# Patient Record
Sex: FEMALE | Race: WHITE | NOT HISPANIC OR LATINO | Employment: PART TIME | URBAN - METROPOLITAN AREA
[De-identification: names, ages, dates, MRNs, and addresses within clinical notes are randomized per-mention and may not be internally consistent; named-entity substitution may affect disease eponyms.]

---

## 2017-10-19 ENCOUNTER — OFFICE VISIT (OUTPATIENT)
Dept: FAMILY MEDICINE CLINIC | Facility: CLINIC | Age: 52
End: 2017-10-19

## 2017-10-19 VITALS
DIASTOLIC BLOOD PRESSURE: 72 MMHG | OXYGEN SATURATION: 99 % | WEIGHT: 143.2 LBS | HEART RATE: 71 BPM | SYSTOLIC BLOOD PRESSURE: 114 MMHG | HEIGHT: 63 IN | BODY MASS INDEX: 25.37 KG/M2 | TEMPERATURE: 98.5 F

## 2017-10-19 DIAGNOSIS — L70.9 ACNE, UNSPECIFIED ACNE TYPE: ICD-10-CM

## 2017-10-19 DIAGNOSIS — R10.30 LOWER ABDOMINAL PAIN: ICD-10-CM

## 2017-10-19 DIAGNOSIS — E03.9 HYPOTHYROIDISM, UNSPECIFIED TYPE: Primary | ICD-10-CM

## 2017-10-19 LAB
T4 FREE SERPL-MCNC: 1.11 NG/DL (ref 0.93–1.7)
TSH SERPL DL<=0.005 MIU/L-ACNC: 2.65 MIU/ML (ref 0.27–4.2)

## 2017-10-19 PROCEDURE — 99214 OFFICE O/P EST MOD 30 MIN: CPT | Performed by: NURSE PRACTITIONER

## 2017-10-19 NOTE — PROGRESS NOTES
"Subjective   Keke Gaitan is a 52 y.o. female.     History of Present Illness   Patient's presenting to the office today for refills on her Synthroid which she is using as directed daily without any side effects and her symptoms of hypothyroidism are well controlled.  She's on expansion any fatigue or cold intolerance.  Patient is also needing lab work to be updated regarding her thyroid.    She would also like a refill of her Bactroban ointment which she uses on occasion for acne.  It works well to control her breakouts and she typically uses it daily to twice a day.    She has been experiencing some abdominal pain to the right of her bellybutton that started back in April.  Her father was very ill and he tied in July she was having to do a lot of heavy lifting and moving him around and that's when the pain started.  She thought that it would get better after she no longer was doing heavy lifting but it has not.  She's not noticed any bulges she can't feel anything.  She's had multiple abdominal surgeries including fibroids removed hysterectomy and 2 C-sections in the past.  It comes and goes it hurts worse when she has any bending over to do lifting.    The following portions of the patient's history were reviewed and updated as appropriate: allergies, current medications, past social history and problem list.    Review of Systems   Constitutional: Negative for fever.   All other systems reviewed and are negative.      Objective   /72 (BP Location: Right arm, Patient Position: Sitting)  Pulse 71  Temp 98.5 °F (36.9 °C)  Ht 63\" (160 cm)  Wt 143 lb 3.2 oz (65 kg)  SpO2 99%  BMI 25.37 kg/m2  Physical Exam   Constitutional: She is oriented to person, place, and time. Vital signs are normal. She appears well-developed and well-nourished. No distress.   HENT:   Head: Normocephalic.   Cardiovascular: Normal rate, regular rhythm and normal heart sounds.    Pulmonary/Chest: Effort normal and breath " sounds normal.   Neurological: She is alert and oriented to person, place, and time. Gait normal.   Psychiatric: She has a normal mood and affect. Her behavior is normal. Judgment and thought content normal.   Vitals reviewed.      Assessment/Plan   Problem List Items Addressed This Visit        Endocrine    Hypothyroidism - Primary    Relevant Orders    TSH    T4, free       Nervous and Auditory    Lower abdominal pain       Musculoskeletal and Integument    Acne    Relevant Medications    mupirocin (BACTROBAN) 2 % ointment        Her abdominal exam is normal.  We discussed getting a CT scan to see what the cause of the pain as for now she would like to hold off and give it a little bit longer.  If it does get worse or she decides to investigate with a CT she will return to the office.      Follow up after labs

## 2017-10-20 DIAGNOSIS — E03.9 HYPOTHYROIDISM, UNSPECIFIED TYPE: ICD-10-CM

## 2017-10-20 RX ORDER — LEVOTHYROXINE SODIUM 0.05 MG/1
50 TABLET ORAL DAILY
Qty: 90 TABLET | Refills: 3 | Status: SHIPPED | OUTPATIENT
Start: 2017-10-20 | End: 2018-08-09 | Stop reason: SDUPTHER

## 2018-04-27 ENCOUNTER — OFFICE VISIT (OUTPATIENT)
Dept: FAMILY MEDICINE CLINIC | Facility: CLINIC | Age: 53
End: 2018-04-27

## 2018-04-27 VITALS
BODY MASS INDEX: 25.48 KG/M2 | HEIGHT: 63 IN | WEIGHT: 143.8 LBS | TEMPERATURE: 98.4 F | DIASTOLIC BLOOD PRESSURE: 70 MMHG | SYSTOLIC BLOOD PRESSURE: 120 MMHG | OXYGEN SATURATION: 99 % | HEART RATE: 88 BPM

## 2018-04-27 DIAGNOSIS — J32.9 SINUSITIS, UNSPECIFIED CHRONICITY, UNSPECIFIED LOCATION: Primary | ICD-10-CM

## 2018-04-27 DIAGNOSIS — B37.9 YEAST INFECTION: ICD-10-CM

## 2018-04-27 PROCEDURE — 99213 OFFICE O/P EST LOW 20 MIN: CPT | Performed by: NURSE PRACTITIONER

## 2018-04-27 RX ORDER — ESOMEPRAZOLE MAGNESIUM 40 MG/1
CAPSULE, DELAYED RELEASE ORAL
COMMUNITY
Start: 2017-10-17 | End: 2019-11-11

## 2018-04-27 RX ORDER — CIPROFLOXACIN 500 MG/1
500 TABLET, FILM COATED ORAL EVERY 12 HOURS SCHEDULED
Qty: 20 TABLET | Refills: 0 | Status: SHIPPED | OUTPATIENT
Start: 2018-04-27 | End: 2019-11-11

## 2018-04-27 RX ORDER — FLUCONAZOLE 150 MG/1
150 TABLET ORAL ONCE
Qty: 1 TABLET | Refills: 0 | Status: SHIPPED | OUTPATIENT
Start: 2018-04-27 | End: 2018-04-27

## 2018-04-27 NOTE — PROGRESS NOTES
"Subjective   Keke Gaitan is a 52 y.o. female.     History of Present Illness   Upper Respiratory Infection: Patient complains of symptoms of a URI, possible sinusitis. Symptoms include congestion, cough and swollen glands. Onset of symptoms was 15 days ago, gradually worsening since that time. She also c/o facial pain for the past 9 days .  She is drinking plenty of fluids. Evaluation to date: none. Treatment to date: antihistamines.        The following portions of the patient's history were reviewed and updated as appropriate: allergies, current medications, past social history and problem list.    Review of Systems   HENT: Positive for congestion, postnasal drip, sinus pain and sinus pressure.    Respiratory: Positive for cough.    Neurological: Positive for headaches.   All other systems reviewed and are negative.      Objective   /70 (BP Location: Left arm, Patient Position: Sitting)   Pulse 88   Temp 98.4 °F (36.9 °C)   Ht 160 cm (62.99\")   Wt 65.2 kg (143 lb 12.8 oz)   SpO2 99%   BMI 25.48 kg/m²   Physical Exam   Constitutional: She is oriented to person, place, and time. Vital signs are normal. She appears well-developed and well-nourished. No distress.   HENT:   Head: Normocephalic.   Cardiovascular: Normal rate, regular rhythm and normal heart sounds.    Pulmonary/Chest: Effort normal and breath sounds normal.   Neurological: She is alert and oriented to person, place, and time. Gait normal.   Psychiatric: She has a normal mood and affect. Her behavior is normal. Judgment and thought content normal.   Vitals reviewed.      Assessment/Plan   Problem List Items Addressed This Visit        Respiratory    Sinusitis - Primary    Relevant Medications    ciprofloxacin (CIPRO) 500 MG tablet      Other Visit Diagnoses     Yeast infection        Relevant Medications    fluconazole (DIFLUCAN) 150 MG tablet        rto prn       "

## 2018-08-09 DIAGNOSIS — E03.9 HYPOTHYROIDISM, UNSPECIFIED TYPE: ICD-10-CM

## 2018-08-09 RX ORDER — LEVOTHYROXINE SODIUM 0.05 MG/1
50 TABLET ORAL DAILY
Qty: 90 TABLET | Refills: 0 | Status: SHIPPED | OUTPATIENT
Start: 2018-08-09 | End: 2019-01-08 | Stop reason: SDUPTHER

## 2018-10-19 ENCOUNTER — TELEPHONE (OUTPATIENT)
Dept: FAMILY MEDICINE CLINIC | Facility: CLINIC | Age: 53
End: 2018-10-19

## 2018-10-19 DIAGNOSIS — E03.9 HYPOTHYROIDISM, UNSPECIFIED TYPE: Primary | ICD-10-CM

## 2018-10-22 NOTE — TELEPHONE ENCOUNTER
Pt states she doesn't necessarily need to go to Dr. Khoury, she just knows she is in network with her insurance. If you know somebody different you recommend more, she would rather go there.

## 2018-10-24 ENCOUNTER — TELEPHONE (OUTPATIENT)
Dept: FAMILY MEDICINE CLINIC | Facility: CLINIC | Age: 53
End: 2018-10-24

## 2018-10-24 NOTE — TELEPHONE ENCOUNTER
We referred patient to alexander and she is wondering if she should come in and get labs done before her appointment since she hasn't had labs done in over a year. Please call her and let her know.

## 2019-01-08 ENCOUNTER — TELEPHONE (OUTPATIENT)
Dept: FAMILY MEDICINE CLINIC | Facility: CLINIC | Age: 54
End: 2019-01-08

## 2019-01-08 DIAGNOSIS — E03.9 HYPOTHYROIDISM, UNSPECIFIED TYPE: ICD-10-CM

## 2019-01-08 RX ORDER — LEVOTHYROXINE SODIUM 0.05 MG/1
50 TABLET ORAL DAILY
Qty: 90 TABLET | Refills: 3 | Status: SHIPPED | OUTPATIENT
Start: 2019-01-08 | End: 2019-11-12 | Stop reason: SDUPTHER

## 2019-01-08 NOTE — TELEPHONE ENCOUNTER
Josette,    Pt left you a voicemail with a list of her medications that need to be filled, ninety day supply. Pt forgot to leave pharmacy on message. [facility]

## 2019-11-11 ENCOUNTER — OFFICE VISIT (OUTPATIENT)
Dept: FAMILY MEDICINE CLINIC | Facility: CLINIC | Age: 54
End: 2019-11-11

## 2019-11-11 VITALS
DIASTOLIC BLOOD PRESSURE: 76 MMHG | TEMPERATURE: 98 F | BODY MASS INDEX: 24.77 KG/M2 | OXYGEN SATURATION: 99 % | SYSTOLIC BLOOD PRESSURE: 124 MMHG | HEART RATE: 70 BPM | WEIGHT: 139.8 LBS | HEIGHT: 63 IN

## 2019-11-11 DIAGNOSIS — Z13.1 SCREENING FOR DIABETES MELLITUS: ICD-10-CM

## 2019-11-11 DIAGNOSIS — E78.5 HYPERLIPIDEMIA, UNSPECIFIED HYPERLIPIDEMIA TYPE: ICD-10-CM

## 2019-11-11 DIAGNOSIS — Z00.00 ROUTINE GENERAL MEDICAL EXAMINATION AT A HEALTH CARE FACILITY: Primary | ICD-10-CM

## 2019-11-11 DIAGNOSIS — E03.9 HYPOTHYROIDISM, UNSPECIFIED TYPE: ICD-10-CM

## 2019-11-11 DIAGNOSIS — Z13.0 SCREENING FOR IRON DEFICIENCY ANEMIA: ICD-10-CM

## 2019-11-11 DIAGNOSIS — E55.9 VITAMIN D DEFICIENCY: ICD-10-CM

## 2019-11-11 PROCEDURE — 99396 PREV VISIT EST AGE 40-64: CPT | Performed by: NURSE PRACTITIONER

## 2019-11-11 NOTE — PROGRESS NOTES
"Subjective   Keke Gaitan is a 54 y.o. female.     History of Present Illness   Well Adult Physical: Patient here for a comprehensive physical exam.The patient reports no problems  Do you take any herbs or supplements that were not prescribed by a doctor? no Are you taking calcium supplements? no Are you taking aspirin daily? no      The following portions of the patient's history were reviewed and updated as appropriate: allergies, current medications, past social history and problem list.    Review of Systems   Constitutional: Negative for fever.   Respiratory: Negative for cough and shortness of breath.    Cardiovascular: Negative for chest pain.   Gastrointestinal: Negative for abdominal pain.   Neurological: Negative for dizziness.       Objective   /76 (BP Location: Right arm, Patient Position: Sitting)   Pulse 70   Temp 98 °F (36.7 °C)   Ht 160 cm (63\")   Wt 63.4 kg (139 lb 12.8 oz)   SpO2 99%   BMI 24.76 kg/m²   Physical Exam   Constitutional: She is oriented to person, place, and time. She appears well-developed and well-nourished. No distress.   HENT:   Head: Normocephalic and atraumatic. Hair is normal.   Right Ear: Hearing, tympanic membrane, external ear and ear canal normal. No drainage. No decreased hearing is noted.   Left Ear: Hearing, tympanic membrane, external ear and ear canal normal. No decreased hearing is noted.   Nose: No nasal deformity.   Mouth/Throat: Oropharynx is clear and moist.   Eyes: Conjunctivae, EOM and lids are normal. Pupils are equal, round, and reactive to light. Right eye exhibits no discharge. Left eye exhibits no discharge.   Neck: Normal range of motion. Neck supple. No JVD present. No tracheal deviation present. No thyromegaly present.   Cardiovascular: Normal rate, regular rhythm, normal heart sounds, intact distal pulses and normal pulses. Exam reveals no gallop and no friction rub.   No murmur heard.  Pulmonary/Chest: Effort normal and breath sounds " normal. No respiratory distress. She has no wheezes. She has no rales. She exhibits no tenderness.   Abdominal: Soft. Bowel sounds are normal. She exhibits no distension and no mass. There is no tenderness. There is no rebound and no guarding. No hernia.   Musculoskeletal: Normal range of motion. She exhibits no edema, tenderness or deformity.   Lymphadenopathy:     She has no cervical adenopathy.   Neurological: She is alert and oriented to person, place, and time. She has normal reflexes. She displays normal reflexes. No cranial nerve deficit. She exhibits normal muscle tone. Coordination normal.   Skin: Skin is warm and dry. No rash noted. She is not diaphoretic. No erythema.   Psychiatric: She has a normal mood and affect. Her behavior is normal. Judgment and thought content normal.   Vitals reviewed.      Assessment/Plan      Diagnosis Plan   1. Routine general medical examination at a health care facility     2. Screening for iron deficiency anemia  CBC & Differential   3. Screening for diabetes mellitus  Comprehensive Metabolic Panel   4. Hyperlipidemia, unspecified hyperlipidemia type  Lipid Panel With LDL / HDL Ratio   5. Hypothyroidism, unspecified type  TSH   6. Vitamin D deficiency  Vitamin D 25 Hydroxy     Discussed weight, diet and exercise  Follow up after labs      DUKE Kat  11/11/2019

## 2019-11-12 ENCOUNTER — TELEPHONE (OUTPATIENT)
Dept: FAMILY MEDICINE CLINIC | Facility: CLINIC | Age: 54
End: 2019-11-12

## 2019-11-12 DIAGNOSIS — E03.9 HYPOTHYROIDISM, UNSPECIFIED TYPE: ICD-10-CM

## 2019-11-12 LAB
25(OH)D3+25(OH)D2 SERPL-MCNC: 46.2 NG/ML (ref 30–100)
ALBUMIN SERPL-MCNC: 4.7 G/DL (ref 3.5–5.2)
ALBUMIN/GLOB SERPL: 1.9 G/DL
ALP SERPL-CCNC: 89 U/L (ref 39–117)
ALT SERPL-CCNC: 20 U/L (ref 1–33)
AST SERPL-CCNC: 22 U/L (ref 1–32)
BASOPHILS # BLD AUTO: 0.03 10*3/MM3 (ref 0–0.2)
BASOPHILS NFR BLD AUTO: 0.8 % (ref 0–1.5)
BILIRUB SERPL-MCNC: 0.4 MG/DL (ref 0.2–1.2)
BUN SERPL-MCNC: 17 MG/DL (ref 6–20)
BUN/CREAT SERPL: 21.8 (ref 7–25)
CALCIUM SERPL-MCNC: 9.6 MG/DL (ref 8.6–10.5)
CHLORIDE SERPL-SCNC: 103 MMOL/L (ref 98–107)
CHOLEST SERPL-MCNC: 211 MG/DL (ref 0–200)
CO2 SERPL-SCNC: 29 MMOL/L (ref 22–29)
CREAT SERPL-MCNC: 0.78 MG/DL (ref 0.57–1)
EOSINOPHIL # BLD AUTO: 0.13 10*3/MM3 (ref 0–0.4)
EOSINOPHIL NFR BLD AUTO: 3.6 % (ref 0.3–6.2)
ERYTHROCYTE [DISTWIDTH] IN BLOOD BY AUTOMATED COUNT: 12.5 % (ref 12.3–15.4)
GLOBULIN SER CALC-MCNC: 2.5 GM/DL
GLUCOSE SERPL-MCNC: 91 MG/DL (ref 65–99)
HCT VFR BLD AUTO: 39 % (ref 34–46.6)
HDLC SERPL-MCNC: 66 MG/DL (ref 40–60)
HGB BLD-MCNC: 13.3 G/DL (ref 12–15.9)
IMM GRANULOCYTES # BLD AUTO: 0.01 10*3/MM3 (ref 0–0.05)
IMM GRANULOCYTES NFR BLD AUTO: 0.3 % (ref 0–0.5)
LDLC SERPL CALC-MCNC: 120 MG/DL (ref 0–100)
LDLC/HDLC SERPL: 1.82 {RATIO}
LYMPHOCYTES # BLD AUTO: 1.31 10*3/MM3 (ref 0.7–3.1)
LYMPHOCYTES NFR BLD AUTO: 35.9 % (ref 19.6–45.3)
MCH RBC QN AUTO: 29.6 PG (ref 26.6–33)
MCHC RBC AUTO-ENTMCNC: 34.1 G/DL (ref 31.5–35.7)
MCV RBC AUTO: 86.7 FL (ref 79–97)
MONOCYTES # BLD AUTO: 0.36 10*3/MM3 (ref 0.1–0.9)
MONOCYTES NFR BLD AUTO: 9.9 % (ref 5–12)
NEUTROPHILS # BLD AUTO: 1.81 10*3/MM3 (ref 1.7–7)
NEUTROPHILS NFR BLD AUTO: 49.5 % (ref 42.7–76)
NRBC BLD AUTO-RTO: 0 /100 WBC (ref 0–0.2)
PLATELET # BLD AUTO: 172 10*3/MM3 (ref 140–450)
POTASSIUM SERPL-SCNC: 4 MMOL/L (ref 3.5–5.2)
PROT SERPL-MCNC: 7.2 G/DL (ref 6–8.5)
RBC # BLD AUTO: 4.5 10*6/MM3 (ref 3.77–5.28)
SODIUM SERPL-SCNC: 143 MMOL/L (ref 136–145)
TRIGL SERPL-MCNC: 126 MG/DL (ref 0–150)
TSH SERPL DL<=0.005 MIU/L-ACNC: 3.02 UIU/ML (ref 0.27–4.2)
VLDLC SERPL CALC-MCNC: 25.2 MG/DL
WBC # BLD AUTO: 3.65 10*3/MM3 (ref 3.4–10.8)

## 2019-11-12 RX ORDER — LEVOTHYROXINE SODIUM 0.05 MG/1
50 TABLET ORAL DAILY
Qty: 90 TABLET | Refills: 3 | Status: SHIPPED | OUTPATIENT
Start: 2019-11-12 | End: 2020-11-13 | Stop reason: SDUPTHER

## 2019-11-12 NOTE — TELEPHONE ENCOUNTER
Yes I will.    Patient called and requested a ninety day refill of levothyroxine 50mcg to [facility]

## 2020-10-13 ENCOUNTER — TELEPHONE (OUTPATIENT)
Dept: FAMILY MEDICINE CLINIC | Facility: CLINIC | Age: 55
End: 2020-10-13

## 2020-10-13 NOTE — TELEPHONE ENCOUNTER
DIEGO CALLED ON BEHALF OF PATIENT WONDERING IF THE [facility] IS IN NETWORK FOR PATIENT.     SHE REQUESTED A CALL BACK TO PATIENT DIRECTLY:   [phone]

## 2020-10-14 NOTE — TELEPHONE ENCOUNTER
Pt is aware that this is [facility]'s responsibility to know if [facility] is in network. She states they need the NPI. I advised her to have insurance call [facility] for this info

## 2020-10-14 NOTE — TELEPHONE ENCOUNTER
Yes, the insurance company should know which facility is in network for patient. Please notify patient we received confusing message and let her know to follow up with insurance directly for additional info.

## 2020-11-12 ENCOUNTER — OFFICE VISIT (OUTPATIENT)
Dept: FAMILY MEDICINE CLINIC | Facility: CLINIC | Age: 55
End: 2020-11-12

## 2020-11-12 ENCOUNTER — RESULTS ENCOUNTER (OUTPATIENT)
Dept: FAMILY MEDICINE CLINIC | Facility: CLINIC | Age: 55
End: 2020-11-12

## 2020-11-12 VITALS
TEMPERATURE: 97.8 F | BODY MASS INDEX: 25.48 KG/M2 | SYSTOLIC BLOOD PRESSURE: 122 MMHG | DIASTOLIC BLOOD PRESSURE: 70 MMHG | HEART RATE: 70 BPM | WEIGHT: 143.8 LBS | OXYGEN SATURATION: 98 % | HEIGHT: 63 IN

## 2020-11-12 DIAGNOSIS — Z12.11 COLON CANCER SCREENING: ICD-10-CM

## 2020-11-12 DIAGNOSIS — Z13.0 SCREENING FOR IRON DEFICIENCY ANEMIA: ICD-10-CM

## 2020-11-12 DIAGNOSIS — Z13.1 SCREENING FOR DIABETES MELLITUS: ICD-10-CM

## 2020-11-12 DIAGNOSIS — E03.9 HYPOTHYROIDISM, UNSPECIFIED TYPE: ICD-10-CM

## 2020-11-12 DIAGNOSIS — Z13.6 SCREENING FOR ISCHEMIC HEART DISEASE: ICD-10-CM

## 2020-11-12 DIAGNOSIS — Z00.00 ROUTINE GENERAL MEDICAL EXAMINATION AT A HEALTH CARE FACILITY: Primary | ICD-10-CM

## 2020-11-12 PROBLEM — J32.9 SINUSITIS: Status: RESOLVED | Noted: 2018-04-27 | Resolved: 2020-11-12

## 2020-11-12 PROCEDURE — 99396 PREV VISIT EST AGE 40-64: CPT | Performed by: NURSE PRACTITIONER

## 2020-11-12 RX ORDER — CALCIUM CARBONATE 200(500)MG
1 TABLET,CHEWABLE ORAL DAILY
COMMUNITY

## 2020-11-12 NOTE — PROGRESS NOTES
"Subjective   Keke Gaitan is a 55 y.o. female.   Chief Complaint   Patient presents with   • Annual Exam     Patient is fasting to have her labs drawn and her thyroid levels checked  ( wore mask and goggles)        History of Present Illness   Well Adult Physical: Patient here for a comprehensive physical exam.The patient reports no problems  Do you take any herbs or supplements that were not prescribed by a doctor? no Are you taking calcium supplements? no Are you taking aspirin daily? no      The following portions of the patient's history were reviewed and updated as appropriate: allergies, current medications, past social history and problem list.    Review of Systems   Constitutional: Negative for fever.   Respiratory: Negative for cough and shortness of breath.    Cardiovascular: Negative for chest pain.   Gastrointestinal: Negative for abdominal pain.   Neurological: Negative for dizziness.       Objective   /70   Pulse 70   Temp 97.8 °F (36.6 °C)   Ht 160 cm (63\")   Wt 65.2 kg (143 lb 12.8 oz)   SpO2 98%   BMI 25.47 kg/m²   Physical Exam  Vitals signs reviewed.   Constitutional:       General: She is not in acute distress.     Appearance: She is well-developed. She is not diaphoretic.   HENT:      Head: Normocephalic and atraumatic. Hair is normal.      Right Ear: Hearing, tympanic membrane, ear canal and external ear normal. No decreased hearing noted. No drainage.      Left Ear: Hearing, tympanic membrane, ear canal and external ear normal. No decreased hearing noted.      Nose: No nasal deformity.   Eyes:      General: Lids are normal.         Right eye: No discharge.         Left eye: No discharge.      Conjunctiva/sclera: Conjunctivae normal.      Pupils: Pupils are equal, round, and reactive to light.   Neck:      Musculoskeletal: Normal range of motion and neck supple.      Thyroid: No thyromegaly.      Vascular: No JVD.      Trachea: No tracheal deviation.   Cardiovascular:      " Rate and Rhythm: Normal rate and regular rhythm.      Pulses: Normal pulses.      Heart sounds: Normal heart sounds. No murmur. No friction rub. No gallop.    Pulmonary:      Effort: Pulmonary effort is normal. No respiratory distress.      Breath sounds: Normal breath sounds. No wheezing or rales.   Chest:      Chest wall: No tenderness.   Abdominal:      General: Bowel sounds are normal. There is no distension.      Palpations: Abdomen is soft. There is no mass.      Tenderness: There is no abdominal tenderness. There is no guarding or rebound.      Hernia: No hernia is present.   Musculoskeletal: Normal range of motion.         General: No tenderness or deformity.   Lymphadenopathy:      Cervical: No cervical adenopathy.   Skin:     General: Skin is warm and dry.      Findings: No erythema or rash.   Neurological:      Mental Status: She is alert and oriented to person, place, and time.      Cranial Nerves: No cranial nerve deficit.      Motor: No abnormal muscle tone.      Coordination: Coordination normal.      Deep Tendon Reflexes: Reflexes are normal and symmetric. Reflexes normal.   Psychiatric:         Behavior: Behavior normal.         Thought Content: Thought content normal.         Judgment: Judgment normal.         Assessment/Plan      Diagnosis Plan   1. Routine general medical examination at a health care facility     2. Screening for iron deficiency anemia  CBC & Differential   3. Screening for diabetes mellitus  Comprehensive Metabolic Panel   4. Screening for ischemic heart disease  Lipid Panel With LDL / HDL Ratio   5. Hypothyroidism, unspecified type  TSH   6. Colon cancer screening  Cologuard - Stool, Per Rectum     Discussed weight, diet and exercise  Follow up after labs      Mask and googles worn    DUKE Kat  11/12/2020

## 2020-11-13 DIAGNOSIS — E03.9 HYPOTHYROIDISM, UNSPECIFIED TYPE: ICD-10-CM

## 2020-11-13 LAB
ALBUMIN SERPL-MCNC: 4.6 G/DL (ref 3.5–5.2)
ALBUMIN/GLOB SERPL: 1.9 G/DL
ALP SERPL-CCNC: 91 U/L (ref 39–117)
ALT SERPL-CCNC: 26 U/L (ref 1–33)
AST SERPL-CCNC: 24 U/L (ref 1–32)
BASOPHILS # BLD AUTO: 0.03 10*3/MM3 (ref 0–0.2)
BASOPHILS NFR BLD AUTO: 0.6 % (ref 0–1.5)
BILIRUB SERPL-MCNC: 0.3 MG/DL (ref 0–1.2)
BUN SERPL-MCNC: 16 MG/DL (ref 6–20)
BUN/CREAT SERPL: 19.3 (ref 7–25)
CALCIUM SERPL-MCNC: 9.6 MG/DL (ref 8.6–10.5)
CHLORIDE SERPL-SCNC: 101 MMOL/L (ref 98–107)
CHOLEST SERPL-MCNC: 215 MG/DL (ref 0–200)
CO2 SERPL-SCNC: 30.6 MMOL/L (ref 22–29)
CREAT SERPL-MCNC: 0.83 MG/DL (ref 0.57–1)
EOSINOPHIL # BLD AUTO: 0.13 10*3/MM3 (ref 0–0.4)
EOSINOPHIL NFR BLD AUTO: 2.4 % (ref 0.3–6.2)
ERYTHROCYTE [DISTWIDTH] IN BLOOD BY AUTOMATED COUNT: 12.8 % (ref 12.3–15.4)
GLOBULIN SER CALC-MCNC: 2.4 GM/DL
GLUCOSE SERPL-MCNC: 89 MG/DL (ref 65–99)
HCT VFR BLD AUTO: 42.1 % (ref 34–46.6)
HDLC SERPL-MCNC: 76 MG/DL (ref 40–60)
HGB BLD-MCNC: 13.6 G/DL (ref 12–15.9)
IMM GRANULOCYTES # BLD AUTO: 0.02 10*3/MM3 (ref 0–0.05)
IMM GRANULOCYTES NFR BLD AUTO: 0.4 % (ref 0–0.5)
LDLC SERPL CALC-MCNC: 120 MG/DL (ref 0–100)
LDLC/HDLC SERPL: 1.54 {RATIO}
LYMPHOCYTES # BLD AUTO: 1.57 10*3/MM3 (ref 0.7–3.1)
LYMPHOCYTES NFR BLD AUTO: 28.9 % (ref 19.6–45.3)
MCH RBC QN AUTO: 28 PG (ref 26.6–33)
MCHC RBC AUTO-ENTMCNC: 32.3 G/DL (ref 31.5–35.7)
MCV RBC AUTO: 86.6 FL (ref 79–97)
MONOCYTES # BLD AUTO: 0.39 10*3/MM3 (ref 0.1–0.9)
MONOCYTES NFR BLD AUTO: 7.2 % (ref 5–12)
NEUTROPHILS # BLD AUTO: 3.29 10*3/MM3 (ref 1.7–7)
NEUTROPHILS NFR BLD AUTO: 60.5 % (ref 42.7–76)
NRBC BLD AUTO-RTO: 0 /100 WBC (ref 0–0.2)
PLATELET # BLD AUTO: 193 10*3/MM3 (ref 140–450)
POTASSIUM SERPL-SCNC: 4.2 MMOL/L (ref 3.5–5.2)
PROT SERPL-MCNC: 7 G/DL (ref 6–8.5)
RBC # BLD AUTO: 4.86 10*6/MM3 (ref 3.77–5.28)
SODIUM SERPL-SCNC: 139 MMOL/L (ref 136–145)
TRIGL SERPL-MCNC: 111 MG/DL (ref 0–150)
TSH SERPL DL<=0.005 MIU/L-ACNC: 2.74 UIU/ML (ref 0.27–4.2)
VLDLC SERPL CALC-MCNC: 19 MG/DL (ref 5–40)
WBC # BLD AUTO: 5.43 10*3/MM3 (ref 3.4–10.8)

## 2020-11-13 RX ORDER — LEVOTHYROXINE SODIUM 0.05 MG/1
50 TABLET ORAL DAILY
Qty: 90 TABLET | Refills: 3 | Status: SHIPPED | OUTPATIENT
Start: 2020-11-13 | End: 2021-09-27

## 2021-01-06 ENCOUNTER — TELEPHONE (OUTPATIENT)
Dept: FAMILY MEDICINE CLINIC | Facility: CLINIC | Age: 56
End: 2021-01-06

## 2021-01-06 NOTE — TELEPHONE ENCOUNTER
Patient called and she had blood drawn 11/12 and since then she has been experiencing pain in her rt arm above and below where the blood was drawn. Sometimes get a tingling down to her fingers. The vein now looks different as well. Would like a call back to advise at [phone]. Not sure if anything can be done just been frustrated with it and hoping it will get better and the more she does with her arm the worse it gets.

## 2021-01-06 NOTE — TELEPHONE ENCOUNTER
Not protruding or hard just looks more blue than normal. She describes it as looking like a blue vein in your leg but its below where blood was drawn and it feels bruised. She gets sharp pains in that area, it hurts when she uses her arm, touches it and even when she is just laying there it is aching but started after she had blood draw. She has had tingling in her right pinky and hand. She just wants christy to be aware and if there is something that needs to be done

## 2021-01-13 ENCOUNTER — TELEPHONE (OUTPATIENT)
Dept: FAMILY MEDICINE CLINIC | Facility: CLINIC | Age: 56
End: 2021-01-13

## 2021-01-13 NOTE — TELEPHONE ENCOUNTER
PATIENT CALLED TO CANCEL APPOINTMENT FOR Friday AND HAS ICED HER RIGHT ARM AND NOT MUCH CHANGE. SHE WILL CONTINUE TO WAIT AND SEE IF ANY IMPROVEMENT WITH TIME.     CALL BACK NUMBER [phone]

## 2021-09-26 DIAGNOSIS — E03.9 HYPOTHYROIDISM, UNSPECIFIED TYPE: ICD-10-CM

## 2021-09-27 RX ORDER — LEVOTHYROXINE SODIUM 0.05 MG/1
TABLET ORAL
Qty: 90 TABLET | Refills: 3 | Status: SHIPPED | OUTPATIENT
Start: 2021-09-27 | End: 2021-12-28 | Stop reason: SDUPTHER

## 2021-11-15 ENCOUNTER — OFFICE VISIT (OUTPATIENT)
Dept: FAMILY MEDICINE CLINIC | Facility: CLINIC | Age: 56
End: 2021-11-15

## 2021-11-15 VITALS
HEART RATE: 70 BPM | HEIGHT: 63 IN | TEMPERATURE: 97.1 F | OXYGEN SATURATION: 99 % | SYSTOLIC BLOOD PRESSURE: 110 MMHG | BODY MASS INDEX: 25.73 KG/M2 | DIASTOLIC BLOOD PRESSURE: 74 MMHG | WEIGHT: 145.2 LBS

## 2021-11-15 DIAGNOSIS — Z00.00 ROUTINE GENERAL MEDICAL EXAMINATION AT A HEALTH CARE FACILITY: Primary | ICD-10-CM

## 2021-11-15 DIAGNOSIS — Z13.0 SCREENING FOR IRON DEFICIENCY ANEMIA: ICD-10-CM

## 2021-11-15 DIAGNOSIS — Z13.1 SCREENING FOR DIABETES MELLITUS: ICD-10-CM

## 2021-11-15 DIAGNOSIS — E78.5 HYPERLIPIDEMIA, UNSPECIFIED HYPERLIPIDEMIA TYPE: ICD-10-CM

## 2021-11-15 DIAGNOSIS — E55.9 VITAMIN D DEFICIENCY: ICD-10-CM

## 2021-11-15 DIAGNOSIS — E03.9 HYPOTHYROIDISM, UNSPECIFIED TYPE: ICD-10-CM

## 2021-11-15 PROCEDURE — 99396 PREV VISIT EST AGE 40-64: CPT | Performed by: NURSE PRACTITIONER

## 2021-11-15 NOTE — PROGRESS NOTES
"Chief Complaint  Annual Exam (Patient is fasting she needs thyroid levels checked as well.  She wants if she needs to do thyroid u/s  ( wore mask and goggles) )    Subjective          Keke Gaitan presents to [facility]  History of Present Illness  Well Adult Physical: Patient here for a comprehensive physical exam.The patient reports no problems  Do you take any herbs or supplements that were not prescribed by a doctor? no Are you taking calcium supplements? no Are you taking aspirin daily? no        Objective   Vital Signs:   /74   Pulse 70   Temp 97.1 °F (36.2 °C)   Ht 160 cm (63\")   Wt 65.9 kg (145 lb 3.2 oz)   SpO2 99%   BMI 25.72 kg/m²     Physical Exam  Vitals reviewed.   Constitutional:       General: She is not in acute distress.     Appearance: She is well-developed. She is not diaphoretic.   HENT:      Head: Normocephalic and atraumatic. Hair is normal.      Right Ear: Hearing, tympanic membrane, ear canal and external ear normal. No decreased hearing noted. No drainage.      Left Ear: Hearing, tympanic membrane, ear canal and external ear normal. No decreased hearing noted.      Nose: No nasal deformity.   Eyes:      General: Lids are normal.         Right eye: No discharge.         Left eye: No discharge.      Conjunctiva/sclera: Conjunctivae normal.      Pupils: Pupils are equal, round, and reactive to light.   Neck:      Thyroid: No thyromegaly.      Vascular: No JVD.      Trachea: No tracheal deviation.   Cardiovascular:      Rate and Rhythm: Normal rate and regular rhythm.      Pulses: Normal pulses.      Heart sounds: Normal heart sounds. No murmur heard.  No friction rub. No gallop.    Pulmonary:      Effort: Pulmonary effort is normal. No respiratory distress.      Breath sounds: Normal breath sounds. No wheezing or rales.   Chest:      Chest wall: No tenderness.   Abdominal:      General: Bowel sounds are normal. There is no distension.      " Palpations: Abdomen is soft. There is no mass.      Tenderness: There is no abdominal tenderness. There is no guarding or rebound.      Hernia: No hernia is present.   Musculoskeletal:         General: No tenderness or deformity. Normal range of motion.      Cervical back: Normal range of motion and neck supple.   Lymphadenopathy:      Cervical: No cervical adenopathy.   Skin:     General: Skin is warm and dry.      Findings: No erythema or rash.   Neurological:      Mental Status: She is alert and oriented to person, place, and time.      Cranial Nerves: No cranial nerve deficit.      Motor: No abnormal muscle tone.      Coordination: Coordination normal.      Deep Tendon Reflexes: Reflexes are normal and symmetric. Reflexes normal.   Psychiatric:         Behavior: Behavior normal.         Thought Content: Thought content normal.         Judgment: Judgment normal.        Result Review :   The following data was reviewed by: DUKE Kat on 11/15/2021:                              Assessment and Plan    Diagnoses and all orders for this visit:    1. Routine general medical examination at a health care facility (Primary)    2. Screening for iron deficiency anemia  -     CBC & Differential    3. Screening for diabetes mellitus  -     Comprehensive Metabolic Panel    4. Hyperlipidemia, unspecified hyperlipidemia type  -     Comprehensive Metabolic Panel  -     Lipid Panel With LDL / HDL Ratio    5. Hypothyroidism, unspecified type  -     TSH  -     US Thyroid    6. Vitamin D deficiency  -     Vitamin D 25 Hydroxy        Follow Up   Return if symptoms worsen or fail to improve.  Patient was given instructions and counseling regarding her condition or for health maintenance advice. Please see specific information pulled into the AVS if appropriate.     Discussed weight, diet and exercise  Follow up after labs    Mask and googles worn  cologuard was last year- normal  mammo is this Friday

## 2021-11-16 LAB
25(OH)D3+25(OH)D2 SERPL-MCNC: 44.5 NG/ML (ref 30–100)
ALBUMIN SERPL-MCNC: 4.5 G/DL (ref 3.8–4.9)
ALBUMIN/GLOB SERPL: 1.6 {RATIO} (ref 1.2–2.2)
ALP SERPL-CCNC: 98 IU/L (ref 44–121)
ALT SERPL-CCNC: 34 IU/L (ref 0–32)
AST SERPL-CCNC: 33 IU/L (ref 0–40)
BASOPHILS # BLD AUTO: 0 X10E3/UL (ref 0–0.2)
BASOPHILS NFR BLD AUTO: 1 %
BILIRUB SERPL-MCNC: 0.3 MG/DL (ref 0–1.2)
BUN SERPL-MCNC: 16 MG/DL (ref 6–24)
BUN/CREAT SERPL: 21 (ref 9–23)
CALCIUM SERPL-MCNC: 9.6 MG/DL (ref 8.7–10.2)
CHLORIDE SERPL-SCNC: 102 MMOL/L (ref 96–106)
CHOLEST SERPL-MCNC: 223 MG/DL (ref 100–199)
CO2 SERPL-SCNC: 25 MMOL/L (ref 20–29)
CREAT SERPL-MCNC: 0.78 MG/DL (ref 0.57–1)
EOSINOPHIL # BLD AUTO: 0.1 X10E3/UL (ref 0–0.4)
EOSINOPHIL NFR BLD AUTO: 2 %
ERYTHROCYTE [DISTWIDTH] IN BLOOD BY AUTOMATED COUNT: 12.4 % (ref 11.7–15.4)
GLOBULIN SER CALC-MCNC: 2.8 G/DL (ref 1.5–4.5)
GLUCOSE SERPL-MCNC: 88 MG/DL (ref 65–99)
HCT VFR BLD AUTO: 42.2 % (ref 34–46.6)
HDLC SERPL-MCNC: 70 MG/DL
HGB BLD-MCNC: 13.6 G/DL (ref 11.1–15.9)
IMM GRANULOCYTES # BLD AUTO: 0 X10E3/UL (ref 0–0.1)
IMM GRANULOCYTES NFR BLD AUTO: 0 %
LDLC SERPL CALC-MCNC: 131 MG/DL (ref 0–99)
LDLC/HDLC SERPL: 1.9 RATIO (ref 0–3.2)
LYMPHOCYTES # BLD AUTO: 1.4 X10E3/UL (ref 0.7–3.1)
LYMPHOCYTES NFR BLD AUTO: 33 %
MCH RBC QN AUTO: 28 PG (ref 26.6–33)
MCHC RBC AUTO-ENTMCNC: 32.2 G/DL (ref 31.5–35.7)
MCV RBC AUTO: 87 FL (ref 79–97)
MONOCYTES # BLD AUTO: 0.3 X10E3/UL (ref 0.1–0.9)
MONOCYTES NFR BLD AUTO: 7 %
NEUTROPHILS # BLD AUTO: 2.5 X10E3/UL (ref 1.4–7)
NEUTROPHILS NFR BLD AUTO: 57 %
PLATELET # BLD AUTO: 190 X10E3/UL (ref 150–450)
POTASSIUM SERPL-SCNC: 4.3 MMOL/L (ref 3.5–5.2)
PROT SERPL-MCNC: 7.3 G/DL (ref 6–8.5)
RBC # BLD AUTO: 4.86 X10E6/UL (ref 3.77–5.28)
SODIUM SERPL-SCNC: 140 MMOL/L (ref 134–144)
TRIGL SERPL-MCNC: 125 MG/DL (ref 0–149)
TSH SERPL DL<=0.005 MIU/L-ACNC: 1.7 UIU/ML (ref 0.45–4.5)
VLDLC SERPL CALC-MCNC: 22 MG/DL (ref 5–40)
WBC # BLD AUTO: 4.4 X10E3/UL (ref 3.4–10.8)

## 2021-11-17 RX ORDER — ROSUVASTATIN CALCIUM 5 MG/1
5 TABLET, COATED ORAL DAILY
Qty: 90 TABLET | Refills: 1 | Status: SHIPPED | OUTPATIENT
Start: 2021-11-17 | End: 2021-12-28 | Stop reason: SDUPTHER

## 2021-12-15 ENCOUNTER — APPOINTMENT (OUTPATIENT)
Dept: ULTRASOUND IMAGING | Facility: HOSPITAL | Age: 56
End: 2021-12-15

## 2021-12-27 ENCOUNTER — HOSPITAL ENCOUNTER (OUTPATIENT)
Dept: ULTRASOUND IMAGING | Facility: HOSPITAL | Age: 56
Discharge: HOME | End: 2021-12-27
Admitting: NURSE PRACTITIONER

## 2021-12-27 PROCEDURE — 76536 US EXAM OF HEAD AND NECK: CPT

## 2021-12-28 DIAGNOSIS — E03.9 HYPOTHYROIDISM, UNSPECIFIED TYPE: ICD-10-CM

## 2021-12-28 RX ORDER — ROSUVASTATIN CALCIUM 5 MG/1
5 TABLET, COATED ORAL DAILY
Qty: 90 TABLET | Refills: 3 | Status: SHIPPED | OUTPATIENT
Start: 2021-12-28 | End: 2022-10-04 | Stop reason: SDUPTHER

## 2021-12-28 RX ORDER — LEVOTHYROXINE SODIUM 0.05 MG/1
50 TABLET ORAL DAILY
Qty: 90 TABLET | Refills: 3 | Status: SHIPPED | OUTPATIENT
Start: 2021-12-28 | End: 2022-05-19 | Stop reason: DRUGHIGH

## 2021-12-28 NOTE — TELEPHONE ENCOUNTER
Rx Refill Note  Requested Prescriptions     Signed Prescriptions Disp Refills   • levothyroxine (SYNTHROID, LEVOTHROID) 50 MCG tablet 90 tablet 3     Sig: Take 1 tablet by mouth Daily.     Authorizing Provider: CELI VICTOR     Ordering User: EDNA RAMIREZ   • rosuvastatin (Crestor) 5 MG tablet 90 tablet 3     Sig: Take 1 tablet by mouth Daily.     Authorizing Provider: CELI VICTOR     Ordering User: EDNA RAMIREZ      Last office visit with prescribing clinician: 11/15/2021      Next office visit with prescribing clinician: 11/18/2022            Edna Ramirez MA  12/28/21, 16:07 EST

## 2022-05-11 DIAGNOSIS — Z13.0 SCREENING FOR IRON DEFICIENCY ANEMIA: ICD-10-CM

## 2022-05-11 DIAGNOSIS — E78.5 HYPERLIPIDEMIA, UNSPECIFIED HYPERLIPIDEMIA TYPE: ICD-10-CM

## 2022-05-11 DIAGNOSIS — Z13.1 SCREENING FOR DIABETES MELLITUS: ICD-10-CM

## 2022-05-11 DIAGNOSIS — Z13.0 SCREENING FOR IRON DEFICIENCY ANEMIA: Primary | ICD-10-CM

## 2022-05-11 DIAGNOSIS — E55.9 VITAMIN D DEFICIENCY: ICD-10-CM

## 2022-05-11 DIAGNOSIS — E03.9 HYPOTHYROIDISM, UNSPECIFIED TYPE: ICD-10-CM

## 2022-05-19 ENCOUNTER — TELEPHONE (OUTPATIENT)
Dept: FAMILY MEDICINE CLINIC | Facility: CLINIC | Age: 57
End: 2022-05-19

## 2022-05-19 DIAGNOSIS — E78.5 HYPERLIPIDEMIA, UNSPECIFIED HYPERLIPIDEMIA TYPE: Primary | ICD-10-CM

## 2022-05-19 LAB
25(OH)D3+25(OH)D2 SERPL-MCNC: 45.5 NG/ML (ref 30–100)
ALBUMIN SERPL-MCNC: 4.8 G/DL (ref 3.8–4.9)
ALBUMIN/GLOB SERPL: 1.9 {RATIO} (ref 1.2–2.2)
ALP SERPL-CCNC: 88 IU/L (ref 44–121)
ALT SERPL-CCNC: 32 IU/L (ref 0–32)
AST SERPL-CCNC: 33 IU/L (ref 0–40)
BASOPHILS # BLD AUTO: 0 X10E3/UL (ref 0–0.2)
BASOPHILS NFR BLD AUTO: 1 %
BILIRUB SERPL-MCNC: 0.3 MG/DL (ref 0–1.2)
BUN SERPL-MCNC: 16 MG/DL (ref 6–24)
BUN/CREAT SERPL: 19 (ref 9–23)
CALCIUM SERPL-MCNC: 10.1 MG/DL (ref 8.7–10.2)
CHLORIDE SERPL-SCNC: 102 MMOL/L (ref 96–106)
CHOLEST SERPL-MCNC: 180 MG/DL (ref 100–199)
CO2 SERPL-SCNC: 25 MMOL/L (ref 20–29)
CREAT SERPL-MCNC: 0.83 MG/DL (ref 0.57–1)
EGFRCR SERPLBLD CKD-EPI 2021: 83 ML/MIN/1.73
EOSINOPHIL # BLD AUTO: 0.1 X10E3/UL (ref 0–0.4)
EOSINOPHIL NFR BLD AUTO: 3 %
ERYTHROCYTE [DISTWIDTH] IN BLOOD BY AUTOMATED COUNT: 12.5 % (ref 11.7–15.4)
GLOBULIN SER CALC-MCNC: 2.5 G/DL (ref 1.5–4.5)
GLUCOSE SERPL-MCNC: 83 MG/DL (ref 65–99)
HCT VFR BLD AUTO: 41.2 % (ref 34–46.6)
HDLC SERPL-MCNC: 79 MG/DL
HGB BLD-MCNC: 13.6 G/DL (ref 11.1–15.9)
IMM GRANULOCYTES # BLD AUTO: 0 X10E3/UL (ref 0–0.1)
IMM GRANULOCYTES NFR BLD AUTO: 0 %
LDLC SERPL CALC-MCNC: 87 MG/DL (ref 0–99)
LYMPHOCYTES # BLD AUTO: 1.7 X10E3/UL (ref 0.7–3.1)
LYMPHOCYTES NFR BLD AUTO: 40 %
MCH RBC QN AUTO: 28.4 PG (ref 26.6–33)
MCHC RBC AUTO-ENTMCNC: 33 G/DL (ref 31.5–35.7)
MCV RBC AUTO: 86 FL (ref 79–97)
MONOCYTES # BLD AUTO: 0.4 X10E3/UL (ref 0.1–0.9)
MONOCYTES NFR BLD AUTO: 10 %
NEUTROPHILS # BLD AUTO: 2.1 X10E3/UL (ref 1.4–7)
NEUTROPHILS NFR BLD AUTO: 46 %
PLATELET # BLD AUTO: 183 X10E3/UL (ref 150–450)
POTASSIUM SERPL-SCNC: 4.1 MMOL/L (ref 3.5–5.2)
PROT SERPL-MCNC: 7.3 G/DL (ref 6–8.5)
RBC # BLD AUTO: 4.79 X10E6/UL (ref 3.77–5.28)
SODIUM SERPL-SCNC: 142 MMOL/L (ref 134–144)
TRIGL SERPL-MCNC: 73 MG/DL (ref 0–149)
TSH SERPL DL<=0.005 MIU/L-ACNC: 5.56 UIU/ML (ref 0.45–4.5)
VLDLC SERPL CALC-MCNC: 14 MG/DL (ref 5–40)
WBC # BLD AUTO: 4.4 X10E3/UL (ref 3.4–10.8)

## 2022-05-19 RX ORDER — LEVOTHYROXINE SODIUM 0.07 MG/1
75 TABLET ORAL DAILY
Qty: 90 TABLET | Refills: 2 | Status: SHIPPED | OUTPATIENT
Start: 2022-05-19 | End: 2022-10-04 | Stop reason: SDUPTHER

## 2022-05-19 NOTE — TELEPHONE ENCOUNTER
Caller: Keke Gaitan Beth    Relationship: Self    Best call back number: [phone]    What was the call regarding: PATIENT STATED SHE SPOKE TO SOMEONE EARLIER REGARDING A CHANGE IN HER PRESCRIPTION. PATIENT STATED THAT SHE IS CURRENTLY TAKING LEVOTHYROXINE 50 MG AND THAT IS GOING TO CHANGE TO 75MG. PATIENT WAS CALLING TO SEE IF SHE CAN DO ONE AND A HALF TABLETS OF HER 50 MG TABLETS UNTIL SHE RECEIVES HER MEDICATION IN THE MAIL. PLEASE ADVISE.    Do you require a callback: YES

## 2022-05-20 ENCOUNTER — TELEPHONE (OUTPATIENT)
Dept: FAMILY MEDICINE CLINIC | Facility: CLINIC | Age: 57
End: 2022-05-20

## 2022-05-20 NOTE — TELEPHONE ENCOUNTER
Caller: Keke Gaitan    Relationship to patient: Self    Best call back number: [phone]    Patient is needing: PATIENT REQUEST A COPY OF THE LABS THAT WERE DONE ON 5/18 TO MAILED TO HER. PLEASE MAIL TO ADDRESS IN CHART.

## 2022-05-27 ENCOUNTER — TELEPHONE (OUTPATIENT)
Dept: FAMILY MEDICINE CLINIC | Facility: CLINIC | Age: 57
End: 2022-05-27

## 2022-05-27 DIAGNOSIS — E03.9 HYPOTHYROIDISM, UNSPECIFIED TYPE: Primary | ICD-10-CM

## 2022-05-27 NOTE — TELEPHONE ENCOUNTER
Caller: Arnie Keke Reynoso    Relationship: Self    Best call back number: [phone]     What orders are you requesting (i.e. lab or imaging): LAB ORDER    In what timeframe would the patient need to come in: 4 WEEKS    Where will you receive your lab/imaging services: [facility]    Additional notes: PATIENT IS CALLING TO SCHEDULE LABS AFTER STARTING NEW THYROID MEDICATION DOSE.  SHE STATES 6 WEEKS WILL BE AROUND 07/01/22.  PATIENT WOULD LIKE A CALL WHEN SHE CAN SCHEDULE THE LABS.    PLEASE ADVISE.

## 2022-06-15 DIAGNOSIS — E03.9 HYPOTHYROIDISM, UNSPECIFIED TYPE: ICD-10-CM

## 2022-06-21 ENCOUNTER — TELEPHONE (OUTPATIENT)
Dept: FAMILY MEDICINE CLINIC | Facility: CLINIC | Age: 57
End: 2022-06-21

## 2022-06-21 NOTE — TELEPHONE ENCOUNTER
Pt had labs scheduled (ordered by Rafael) to check Thyroid due to a change in medication.  Pt states she will need to keep her July 1st appt for Thyroid labs to recheck the medication and see if it needs to be changed.     Scheduled for new pt with Dr. Erazo in October.  Please advise if labs can be placed for pt by Dr. Erazo.

## 2022-07-02 LAB — TSH SERPL DL<=0.005 MIU/L-ACNC: 1.47 UIU/ML (ref 0.45–4.5)

## 2022-10-04 DIAGNOSIS — E78.5 HYPERLIPIDEMIA, UNSPECIFIED HYPERLIPIDEMIA TYPE: ICD-10-CM

## 2022-10-04 RX ORDER — ROSUVASTATIN CALCIUM 5 MG/1
5 TABLET, COATED ORAL DAILY
Qty: 90 TABLET | Refills: 0 | Status: SHIPPED | OUTPATIENT
Start: 2022-10-04 | End: 2023-01-11

## 2022-10-04 RX ORDER — LEVOTHYROXINE SODIUM 0.07 MG/1
75 TABLET ORAL DAILY
Qty: 90 TABLET | Refills: 0 | Status: SHIPPED | OUTPATIENT
Start: 2022-10-04 | End: 2023-01-11

## 2022-10-04 NOTE — TELEPHONE ENCOUNTER
Caller: Keke Gaitan    Relationship: Self    Best call back number: [phone]    Requested Prescriptions:   Requested Prescriptions     Pending Prescriptions Disp Refills   • rosuvastatin (Crestor) 5 MG tablet 90 tablet 3     Sig: Take 1 tablet by mouth Daily.   • levothyroxine (Synthroid) 75 MCG tablet 90 tablet 2     Sig: Take 1 tablet by mouth Daily.        Pharmacy where request should be sent: [id number] [place], [place] - [address] AT [facility] [phone] [facility] [phone] FX     Additional details provided by patient: HAS A COUPLE WEEKS LEFT.  SHE HAD AN APPOINTMENT FOR 10/7/22 BUT THE PROVIDER WAS GOING TO BE OUT.  THE NEXT APPT WE COULD FIND WAS 1/30/22.  SHE WILL BE OUT OF MEDICATION IN A FEW DAYS.  SHE NEEDS TO HAVE ENOUGH MEDICATION TO GET HER THROUGH TILL HER APPOINTMENT IN January 30.      Does the patient have less than a 3 day supply:  [x] Yes  [] No    Gabriela Brown Rep   10/04/22 08:38 EDT

## 2023-01-11 DIAGNOSIS — E78.5 HYPERLIPIDEMIA, UNSPECIFIED HYPERLIPIDEMIA TYPE: ICD-10-CM

## 2023-01-11 RX ORDER — LEVOTHYROXINE SODIUM 0.07 MG/1
75 TABLET ORAL DAILY
Qty: 90 TABLET | Refills: 0 | Status: SHIPPED | OUTPATIENT
Start: 2023-01-11 | End: 2023-01-30 | Stop reason: SDUPTHER

## 2023-01-11 RX ORDER — ROSUVASTATIN CALCIUM 5 MG/1
5 TABLET, COATED ORAL DAILY
Qty: 90 TABLET | Refills: 0 | Status: SHIPPED | OUTPATIENT
Start: 2023-01-11

## 2023-01-30 ENCOUNTER — OFFICE VISIT (OUTPATIENT)
Dept: FAMILY MEDICINE CLINIC | Facility: CLINIC | Age: 58
End: 2023-01-30
Payer: MEDICAID

## 2023-01-30 VITALS
WEIGHT: 150 LBS | SYSTOLIC BLOOD PRESSURE: 120 MMHG | HEART RATE: 70 BPM | OXYGEN SATURATION: 98 % | BODY MASS INDEX: 26.58 KG/M2 | HEIGHT: 63 IN | DIASTOLIC BLOOD PRESSURE: 68 MMHG | TEMPERATURE: 97.5 F

## 2023-01-30 DIAGNOSIS — E04.2 MULTIPLE THYROID NODULES: ICD-10-CM

## 2023-01-30 DIAGNOSIS — E55.9 VITAMIN D DEFICIENCY: ICD-10-CM

## 2023-01-30 DIAGNOSIS — E03.9 HYPOTHYROIDISM, UNSPECIFIED TYPE: ICD-10-CM

## 2023-01-30 DIAGNOSIS — E78.5 HYPERLIPIDEMIA, UNSPECIFIED HYPERLIPIDEMIA TYPE: Primary | ICD-10-CM

## 2023-01-30 PROCEDURE — 99214 OFFICE O/P EST MOD 30 MIN: CPT | Performed by: FAMILY MEDICINE

## 2023-01-30 RX ORDER — ROSUVASTATIN CALCIUM 5 MG/1
5 TABLET, COATED ORAL DAILY
Qty: 90 TABLET | Refills: 1 | Status: CANCELLED | OUTPATIENT
Start: 2023-01-30

## 2023-01-30 RX ORDER — LEVOTHYROXINE SODIUM 0.07 MG/1
75 TABLET ORAL DAILY
Qty: 90 TABLET | Refills: 1 | Status: SHIPPED | OUTPATIENT
Start: 2023-01-30

## 2023-01-30 NOTE — PROGRESS NOTES
Chief Complaint  Hypothyroidism (NEW PATIENT ESTABLISH CARE PREVIOUS CELI PATIENT PATIENT IS FASTING) and Hyperlipidemia     New patient visit to me  Here to get established and needs 6-month follow-up on hyperlipidemia, hypothyroidism, thyroid nodule history, and vitamin D deficiency    Previous PCP --Jewish provider here/Manuela Zavala  Last visit for wellness exam in November 2021, records reviewed.  At that visit, she was started on Crestor 5 mg daily (.)  She had repeat lab work done 6 months later/May 2022 but no follow-up visit.  Those labs in May 2022 resulted with normal LFTs, LDL 87 but high TSH.  Therefore her Synthroid was increased from 50 to 75 mcg every morning.  She did have a follow-up TSH done approximately 6 weeks later which was normal but again no follow-up appointment.    Overall, continues to do well.  Maintains a healthy diet and active lifestyle.  Admits she had a poor diet and some weight gain 1 year ago when she was started on the Crestor.  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  Mood good.  Sleeping fine.    No new complaints or concerns.  However she does report some slight increase in joint arthralgias and myalgias since beginning Crestor 5 mg 1 year ago.  Both parents have a history of hyperlipidemia but no CVA or CAD history in first-degree relatives.  Compliant with and tolerates Synthroid with proper dosing regimen.    Review of Systems   Constitutional: Negative for fever and unexpected weight change.   Respiratory: Negative for cough and shortness of breath.    Cardiovascular: Negative for chest pain.        Subjective          Keke Gaitan presents to [facility]    Objective   Vital Signs:   Vitals:    01/30/23 1003   BP: 120/68   BP Location: Left arm   Patient Position: Sitting  "  Cuff Size: Adult   Pulse: 70   Temp: 97.5 °F (36.4 °C)   SpO2: 98%   Weight: 68 kg (150 lb)   Height: 160 cm (63\")      Body mass index is 26.57 kg/m².   Physical Exam  Vitals and nursing note reviewed.   Constitutional:       Appearance: Normal appearance. She is well-developed.   HENT:      Head: Normocephalic and atraumatic.      Nose: Nose normal.   Eyes:      Conjunctiva/sclera: Conjunctivae normal.      Pupils: Pupils are equal, round, and reactive to light.   Neck:      Thyroid: No thyromegaly.   Cardiovascular:      Rate and Rhythm: Normal rate and regular rhythm.      Heart sounds: Normal heart sounds. No murmur heard.  Pulmonary:      Effort: Pulmonary effort is normal.      Breath sounds: Normal breath sounds.   Abdominal:      General: Abdomen is flat. Bowel sounds are normal. There is no distension.      Palpations: Abdomen is soft. There is no hepatomegaly, splenomegaly or mass.      Tenderness: There is no abdominal tenderness. There is no guarding or rebound.      Hernia: No hernia is present.   Musculoskeletal:         General: Normal range of motion.      Cervical back: Normal range of motion and neck supple.      Right lower leg: No edema.      Left lower leg: No edema.   Lymphadenopathy:      Cervical: No cervical adenopathy.   Skin:     General: Skin is warm.   Neurological:      General: No focal deficit present.      Mental Status: She is alert.   Psychiatric:         Mood and Affect: Mood normal.         Behavior: Behavior normal.         Thought Content: Thought content normal.         Judgment: Judgment normal.        Result Review :     Common labs    Common Labs 5/18/22 5/18/22 5/18/22    [id number]   Glucose  83    BUN  16    Creatinine  0.83    Sodium  142    Potassium  4.1    Chloride  102    Calcium  10.1    Total Protein  7.3    Albumin  4.8    Total Bilirubin  0.3    Alkaline Phosphatase  88    AST (SGOT)  33    ALT (SGPT)  32    WBC   4.4   Hemoglobin   13.6   Hematocrit   " 41.2   Platelets   183   Total Cholesterol 180     Triglycerides 73     HDL Cholesterol 79     LDL Cholesterol  87             TSH    TSH 5/18/22 7/1/22   TSH 5.560 (A) 1.470   (A) Abnormal value                Lab Results   Component Value Date    KSHN21DW 45.5 05/18/2022    FOLATE 16.3 11/13/2015      US Thyroid (12/27/2021 16:23)    November 2021 --, HDL greater than 70           Assessment and Plan    Diagnoses and all orders for this visit:    1. Hyperlipidemia, unspecified hyperlipidemia type (Primary)  -controlled  History of mild hyperlipidemia, very low risk patient.  She was started on low-dose Crestor/5 mg about 1 year ago and now having some mild joint arthralgias.  We will recheck lipids, CMP today.  However given low risk patient and only mildly elevated LDL, great HDL suggest that she may stop Crestor 5 mg daily.  Also her Synthroid has been adjusted within the last 6 months and this may help with her lipids as well.  May try no medication, watch closely.  We will recheck lipids in 4 to 6 months, may consider Zetia if needed?  Needs low-carb/low calorie/low cholesterol diet and increase cardio exercise to greater than 150 minutes weekly  -     levothyroxine (SYNTHROID, LEVOTHROID) 75 MCG tablet; Take 1 tablet by mouth Daily.  Dispense: 90 tablet; Refill: 1  -     Comprehensive Metabolic Panel  -     Lipid Panel With LDL / HDL Ratio    2. Hypothyroidism, unspecified type  -controlled  Dose adjusted in May 2022, repeat TSH done in July 22 and therapeutic.  No further change in dosing regimen.  Continue/refill Synthroid 75 mcg 1 p.o. every morning    3. Multiple thyroid nodules  -stable  Last thyroid ultrasound done December 2021, all stable.  Continue with surveillance, recheck every to 1 to 2 years    4. Vitamin D deficiency  -on OTC vitamin D 2000 units, recheck level today.  Further recommendations to follow  -     Vitamin D,25-Hydroxy        Follow Up   Return in about 4 months (around  5/30/2023) for Annual physical.  Patient was given instructions and counseling regarding her condition or for health maintenance advice. Please see specific information pulled into the AVS if appropriate.

## 2023-01-30 NOTE — PATIENT INSTRUCTIONS
May stop Crestor 5 mg daily    Recommend low fat/low calorie diet and exercise greater than 150 minutes of cardio per week.      Continue current treatment plan.

## 2023-01-31 LAB
25(OH)D3+25(OH)D2 SERPL-MCNC: 52.6 NG/ML (ref 30–100)
ALBUMIN SERPL-MCNC: 5 G/DL (ref 3.5–5.2)
ALBUMIN/GLOB SERPL: 2.2 G/DL
ALP SERPL-CCNC: 83 U/L (ref 39–117)
ALT SERPL-CCNC: 38 U/L (ref 1–33)
AST SERPL-CCNC: 31 U/L (ref 1–32)
BILIRUB SERPL-MCNC: 0.4 MG/DL (ref 0–1.2)
BUN SERPL-MCNC: 17 MG/DL (ref 6–20)
BUN/CREAT SERPL: 21.3 (ref 7–25)
CALCIUM SERPL-MCNC: 9.8 MG/DL (ref 8.6–10.5)
CHLORIDE SERPL-SCNC: 104 MMOL/L (ref 98–107)
CHOLEST SERPL-MCNC: 177 MG/DL (ref 0–200)
CO2 SERPL-SCNC: 27.3 MMOL/L (ref 22–29)
CREAT SERPL-MCNC: 0.8 MG/DL (ref 0.57–1)
EGFRCR SERPLBLD CKD-EPI 2021: 86.6 ML/MIN/1.73
GLOBULIN SER CALC-MCNC: 2.3 GM/DL
GLUCOSE SERPL-MCNC: 90 MG/DL (ref 65–99)
HDLC SERPL-MCNC: 70 MG/DL (ref 40–60)
LDLC SERPL CALC-MCNC: 84 MG/DL (ref 0–100)
LDLC/HDLC SERPL: 1.15 {RATIO}
POTASSIUM SERPL-SCNC: 3.8 MMOL/L (ref 3.5–5.2)
PROT SERPL-MCNC: 7.3 G/DL (ref 6–8.5)
SODIUM SERPL-SCNC: 143 MMOL/L (ref 136–145)
TRIGL SERPL-MCNC: 133 MG/DL (ref 0–150)
VLDLC SERPL CALC-MCNC: 23 MG/DL (ref 5–40)

## 2023-02-13 NOTE — PROGRESS NOTES
Labs within normal limits.  Follow up as planned.  Continue with same plan, no Crestor needed.  Watch closely

## 2023-04-10 RX ORDER — ROSUVASTATIN CALCIUM 5 MG/1
5 TABLET, COATED ORAL DAILY
Qty: 90 TABLET | Refills: 0 | Status: SHIPPED | OUTPATIENT
Start: 2023-04-10

## 2023-07-24 ENCOUNTER — TELEPHONE (OUTPATIENT)
Dept: FAMILY MEDICINE CLINIC | Facility: CLINIC | Age: 58
End: 2023-07-24

## 2023-07-24 NOTE — TELEPHONE ENCOUNTER
Caller: Victor ManuelKeke saldaña    Relationship: Self    Best call back number: [phone]     What orders are you requesting (i.e. lab or imaging): LABS    In what timeframe would the patient need to come in: THE WEEK OF 7/31/2023    Where will you receive your lab/imaging services: IN OFFICE     Additional notes: PATIENT'S PHYSICAL FOR 6/30/2023 WAS CANCELED BY DR. BRAR AND SHE WAS RESCHEDULED TO 9/29/2023.  THE PATIENT HAS A TIGHT SCHEDULE DUE TO CARING FOR FAMILY, AND WAS REQUESTING TO HAVE LABS DONE NEXT WEEK. IT'S A CONVENIENT TIME FOR HER, AND THE LABS ARE HER MAIN CONCERN.    THE PATIENT REQUESTED TO MOVE HER PHYSICAL TO NEXT WEEK BUT THERE WAS NO AVAILABILITY.

## 2023-07-25 DIAGNOSIS — E04.2 MULTIPLE THYROID NODULES: ICD-10-CM

## 2023-07-25 DIAGNOSIS — Z13.0 SCREENING FOR IRON DEFICIENCY ANEMIA: ICD-10-CM

## 2023-07-25 DIAGNOSIS — Z13.1 SCREENING FOR DIABETES MELLITUS: ICD-10-CM

## 2023-07-25 DIAGNOSIS — E78.5 HYPERLIPIDEMIA, UNSPECIFIED HYPERLIPIDEMIA TYPE: Primary | ICD-10-CM

## 2023-07-25 DIAGNOSIS — E03.9 HYPOTHYROIDISM, UNSPECIFIED TYPE: ICD-10-CM

## 2023-07-25 DIAGNOSIS — Z00.00 ROUTINE GENERAL MEDICAL EXAMINATION AT A HEALTH CARE FACILITY: ICD-10-CM

## 2023-07-25 NOTE — TELEPHONE ENCOUNTER
Patient is only free on Fridays. Patients physical will remain on 9/29, scheduled lab appointment for the Fri prior 9/22, may clinical place lab orders for patient?

## 2023-09-29 ENCOUNTER — OFFICE VISIT (OUTPATIENT)
Dept: FAMILY MEDICINE CLINIC | Facility: CLINIC | Age: 58
End: 2023-09-29
Payer: MEDICAID

## 2023-09-29 VITALS
HEART RATE: 78 BPM | SYSTOLIC BLOOD PRESSURE: 112 MMHG | TEMPERATURE: 97.5 F | OXYGEN SATURATION: 98 % | WEIGHT: 148.2 LBS | BODY MASS INDEX: 26.26 KG/M2 | DIASTOLIC BLOOD PRESSURE: 76 MMHG | HEIGHT: 63 IN

## 2023-09-29 DIAGNOSIS — Z12.11 ENCOUNTER FOR SCREENING COLONOSCOPY: ICD-10-CM

## 2023-09-29 DIAGNOSIS — E78.5 HYPERLIPIDEMIA, UNSPECIFIED HYPERLIPIDEMIA TYPE: ICD-10-CM

## 2023-09-29 DIAGNOSIS — E03.9 HYPOTHYROIDISM, UNSPECIFIED TYPE: ICD-10-CM

## 2023-09-29 DIAGNOSIS — J34.3 NASAL TURBINATE HYPERTROPHY: ICD-10-CM

## 2023-09-29 DIAGNOSIS — Z00.00 WELLNESS EXAMINATION: Primary | ICD-10-CM

## 2023-09-29 DIAGNOSIS — Z23 FLU VACCINE NEED: ICD-10-CM

## 2023-09-29 DIAGNOSIS — E04.2 MULTIPLE THYROID NODULES: ICD-10-CM

## 2023-09-29 RX ORDER — LEVOTHYROXINE SODIUM 0.07 MG/1
75 TABLET ORAL DAILY
Qty: 90 TABLET | Refills: 1 | Status: SHIPPED | OUTPATIENT
Start: 2023-09-29

## 2023-09-29 NOTE — PROGRESS NOTES
"Chief Complaint  Annual Exam (YEARLY WELLNESS HAS GYN FOR FEMALE WELLNESS HAD LABS LAST WEEK  HAD COLOGUARD 12/2020), Hyperlipidemia (HAS BEEN OFF CHOLESTEROL MEDS), Hypothyroidism, and Nose Problem (FEELS LIKE ONE SIDE SHUTS OFF WHEN SHE BLOWS HER NOSE)    NEEDS ANNUAL WELLNESS  And  Greater than 6-month follow-up on hyperlipidemia, hypothyroidism, and complaints of problem with her nose/left side    LOV with me in January 2023 for chronic med refills with labs.  --- Doing much better clinically with increased dose of Synthroid, repeat TSH normal  --- Decided to stop low-dose Crestor 5 mg due to excellent lipid control, very low risk patient, and complaints of mild arthralgias and myalgias.  Plan was to recheck in 4 to 6 months, maintain a low-cholesterol diet, possibly consider Zetia?    Overall, continues to do well.  Maintains a very healthy/low-cholesterol diet and active lifestyle.  Watches her grandchild routinely and regular cardio exercise.  Weight remains stable.  Mood is good.  Sleeping fine.    Her only new complaint/concern today is a problem with the left side of her nose.  She states when she tries to blow her nose, she does not feel like she can \"blow out of the left side of her nose.\"  History of some mild allergies but does not take any medications.  Some clearing of throat.  No bloody nose.    Otherwise, needs chronic med refills.  She did have repeat fasting lab work done last week, here for review.  Compliant with and tolerates meds without side effects, except statin/myalgias.  Only on Synthroid and vitamins.    Routine health maintenance/screening test:  PAP --- has gynecologist, up-to-date  MAMMO --- done at gynecologist office, up-to-date  DEXA --- Per gynecologist  Colorectal Screen --- Cologuard done December 2020, negative  Vaccines --- not up-to-date  Smoking/ETOH Status --- non-smoker, no EtOH  Dentist, Eye Exam, Derm --- maintains regular dental visits, eye exam, no Derm  Diet/Exercise " "--- maintains a healthy low-cholesterol diet and regular cardio exercise  Pertinent FH --- significant for CVA/parent late 70s, negative for colon cancer, premature CAD    Review of Systems   Constitutional:  Negative for fever and unexpected weight change.   Respiratory:  Negative for cough and shortness of breath.    Cardiovascular:  Negative for chest pain.      Subjective          Keke Gaitan presents to [facility]    Objective   Vital Signs:   Vitals:    09/29/23 1512   BP: 112/76   BP Location: Right arm   Patient Position: Sitting   Cuff Size: Adult   Pulse: 78   Temp: 97.5 °F (36.4 °C)   SpO2: 98%   Weight: 67.2 kg (148 lb 3.2 oz)   Height: 160 cm (63\")      Body mass index is 26.25 kg/m².   Physical Exam  Vitals and nursing note reviewed.   Constitutional:       Appearance: Normal appearance. She is well-developed.   HENT:      Head: Normocephalic and atraumatic.      Comments: Nasal cavity --boggy turbinates, left greater than right, no polyp detected     Nose: Nose normal.   Eyes:      Conjunctiva/sclera: Conjunctivae normal.      Pupils: Pupils are equal, round, and reactive to light.   Neck:      Thyroid: No thyromegaly.   Cardiovascular:      Rate and Rhythm: Normal rate and regular rhythm.      Heart sounds: Normal heart sounds. No murmur heard.  Pulmonary:      Effort: Pulmonary effort is normal.      Breath sounds: Normal breath sounds.   Abdominal:      General: Abdomen is flat. Bowel sounds are normal. There is no distension.      Palpations: Abdomen is soft. There is no hepatomegaly, splenomegaly or mass.      Tenderness: There is no abdominal tenderness. There is no guarding or rebound.      Hernia: No hernia is present.   Musculoskeletal:         General: Normal range of motion.      Cervical back: Normal range of motion and neck supple.      Right lower leg: No edema.      Left lower leg: No edema.   Lymphadenopathy:      Cervical: No cervical adenopathy. "   Skin:     General: Skin is warm.      Comments: Back--- multiple light brown stuck on like plaques  Chest wall, extremities --- multiple light brown patches  No dysplastic nevi   Neurological:      General: No focal deficit present.      Mental Status: She is alert.   Psychiatric:         Mood and Affect: Mood normal.         Behavior: Behavior normal.         Thought Content: Thought content normal.         Judgment: Judgment normal.      Result Review :     Common labs          1/30/2023    10:38 9/22/2023    10:05   Common Labs   Glucose 90  87    BUN 17  18    Creatinine 0.80  0.74    Sodium 143  142    Potassium 3.8  4.1    Chloride 104  104    Calcium 9.8  10.0    Total Protein 7.3  7.5    Albumin 5.0  4.9    Total Bilirubin 0.4  0.4    Alkaline Phosphatase 83  84    AST (SGOT) 31  27    ALT (SGPT) 38  24    WBC  3.43    Hemoglobin  13.6    Hematocrit  40.9    Platelets  173    Total Cholesterol 177  222    Triglycerides 133  102    HDL Cholesterol 70  69    LDL Cholesterol  84  135      Lipid Panel          1/30/2023    10:38 9/22/2023    10:05   Lipid Panel   Total Cholesterol 177  222    Triglycerides 133  102    HDL Cholesterol 70  69    VLDL Cholesterol 23  18    LDL Cholesterol  84  135    LDL/HDL Ratio 1.15  1.92            Lab Results   Component Value Date    HVAN57LA 52.6 01/30/2023    FOLATE 16.3 11/13/2015        TSH (09/22/2023 10:05) --1.4           Assessment and Plan    Diagnoses and all orders for this visit:    1. Wellness examination (Primary) --within normal limits   Has gynecologist, Pap smear and mammogram up-to-date  Needs screening today includes: Repeat colorectal screening (chooses Cologuard,) influenza vaccine, Tdap, shingles vaccine, and COVID booster (plans obtain from pharmacy in near future.)  Will receive influenza vaccine today  Otherwise, continue to improve upon healthy lifestyle --Needs low-carb/low calorie/low cholesterol diet and increase cardio exercise to greater than  150 minutes weekly     2. Encounter for screening colonoscopy --- Cologuard due December 2023  -     Cologuard - Stool, Per Rectum; Future    3. Flu vaccine need  -     Fluzone >6 Months ([id number])    4. Hyperlipidemia, unspecified hyperlipidemia type --- slightly uncontrolled  Mild hyperlipidemia, very good HDL.  Low risk patient.  Myalgias with Crestor.  May remain off medication, given low risk patient.  Plan to recheck lipids in 6 months  Needs low-carb/low calorie/low cholesterol diet and increase cardio exercise to greater than 150 minutes weekly   -     levothyroxine (SYNTHROID, LEVOTHROID) 75 MCG tablet; Take 1 tablet by mouth Daily.  Dispense: 90 tablet; Refill: 1  -     Lipid Panel With LDL / HDL Ratio; Future    5. Hypothyroidism, unspecified type --- controlled  TSH up-to-date and therapeutic, no change with Synthroid 75 mcg every morning    6. Multiple thyroid nodules --- stable  Due to recheck thyroid ultrasound, last done almost 2 years ago.  Asymptomatic  -     US Thyroid    7. Nasal turbinate hypertrophy --- new Dx, left side  No evidence of nasal polyp.  Benign.  Reassurance given.  Likely some component of chronic allergic rhinitis.  May start daily antihistamine and Flonase 2 sprays each nostril daily.    Seborrheic keratosis --- multiple, on back/trunk.  Benign, reassurance given.    In addition to wellness exam today, seen and treated for separate and identifiable --- mild hyperlipidemia/uncontrolled and nasal turbinate hypertrophy/new Dx        Follow Up   Return in about 6 months (around 3/29/2024) for Recheck, lab first then follow-up.  Patient was given instructions and counseling regarding her condition or for health maintenance advice. Please see specific information pulled into the AVS if appropriate.

## 2023-09-30 PROBLEM — J34.3 NASAL TURBINATE HYPERTROPHY: Status: ACTIVE | Noted: 2023-09-30

## 2023-10-01 NOTE — PATIENT INSTRUCTIONS
Add daily antihistamine and nasal steroid    Needs Tdap vaccine, shingles vaccine, and COVID booster plans to obtain from pharmacy in near future--    Due for follow-up thyroid ultrasound due to history of multiple nodules    Due for repeat Cologuard December 2023    Recommend low fat/low calorie diet and exercise greater than 150 minutes of cardio per week.      Continue current treatment plan.

## 2023-10-06 ENCOUNTER — TELEPHONE (OUTPATIENT)
Dept: FAMILY MEDICINE CLINIC | Facility: CLINIC | Age: 58
End: 2023-10-06

## 2023-10-06 NOTE — TELEPHONE ENCOUNTER
Spoke to patient, she chose a new insurance plan, should receive insurance card in about 2 weeks.

## 2024-01-05 DIAGNOSIS — E78.5 HYPERLIPIDEMIA, UNSPECIFIED HYPERLIPIDEMIA TYPE: ICD-10-CM

## 2024-01-05 RX ORDER — LEVOTHYROXINE SODIUM 0.07 MG/1
75 TABLET ORAL DAILY
Qty: 90 TABLET | Refills: 1 | Status: SHIPPED | OUTPATIENT
Start: 2024-01-05

## 2024-01-05 NOTE — TELEPHONE ENCOUNTER
Caller: Keke Gaitan    Relationship: Self    Best call back number: [phone]     Requested Prescriptions:   Requested Prescriptions     Pending Prescriptions Disp Refills    levothyroxine (SYNTHROID, LEVOTHROID) 75 MCG tablet 90 tablet 1     Sig: Take 1 tablet by mouth Daily.        Pharmacy where request should be sent: [facility] [address] [phone] [facility] [phone]      Last office visit with prescribing clinician: 9/29/2023   Last telemedicine visit with prescribing clinician: Visit date not found   Next office visit with prescribing clinician: 4/5/2024     Additional details provided by patient: PATIENT HAS CHANGED PHARMACIES.     Does the patient have less than a 3 day supply:  [] Yes  [x] No    Would you like a call back once the refill request has been completed: [] Yes [x] No    Gabriela Maldonado Rep   01/05/24 12:18 EST

## 2024-01-09 DIAGNOSIS — E78.5 HYPERLIPIDEMIA, UNSPECIFIED HYPERLIPIDEMIA TYPE: ICD-10-CM

## 2024-01-09 RX ORDER — LEVOTHYROXINE SODIUM 0.07 MG/1
75 TABLET ORAL DAILY
Qty: 90 TABLET | Refills: 1 | Status: SHIPPED | OUTPATIENT
Start: 2024-01-09

## 2024-01-12 ENCOUNTER — HOSPITAL ENCOUNTER (OUTPATIENT)
Dept: ULTRASOUND IMAGING | Facility: HOSPITAL | Age: 59
Discharge: HOME | End: 2024-01-12
Admitting: FAMILY MEDICINE
Payer: COMMERCIAL

## 2024-01-12 ENCOUNTER — HOSPITAL ENCOUNTER (OUTPATIENT)
Dept: ULTRASOUND IMAGING | Facility: HOSPITAL | Age: 59
End: 2024-01-12
Payer: COMMERCIAL

## 2024-01-12 PROCEDURE — 76536 US EXAM OF HEAD AND NECK: CPT

## 2024-01-31 NOTE — TELEPHONE ENCOUNTER
Patient is requesting to be referred to endocrinology Dr. Keke Khoury she would like to be referred to Hypothyroidism as well as thyroid nodules. She is requesting we schedule this appointment for her on a Wednesday between 10:00-11:00 if possible.    [Negative] : Heme/Lymph [FreeTextEntry5] :  See HPI

## 2024-02-09 DIAGNOSIS — E78.5 HYPERLIPIDEMIA, UNSPECIFIED HYPERLIPIDEMIA TYPE: ICD-10-CM

## 2024-04-26 LAB
CHOLEST SERPL-MCNC: 222 MG/DL (ref 0–200)
HDLC SERPL-MCNC: 61 MG/DL (ref 40–60)
LDLC SERPL CALC-MCNC: 136 MG/DL (ref 0–100)
LDLC/HDLC SERPL: 2.18 {RATIO}
TRIGL SERPL-MCNC: 139 MG/DL (ref 0–150)
VLDLC SERPL CALC-MCNC: 25 MG/DL (ref 5–40)

## 2024-05-03 ENCOUNTER — OFFICE VISIT (OUTPATIENT)
Dept: FAMILY MEDICINE CLINIC | Facility: CLINIC | Age: 59
End: 2024-05-03
Payer: COMMERCIAL

## 2024-05-03 VITALS
SYSTOLIC BLOOD PRESSURE: 124 MMHG | DIASTOLIC BLOOD PRESSURE: 72 MMHG | BODY MASS INDEX: 25.87 KG/M2 | OXYGEN SATURATION: 99 % | HEIGHT: 63 IN | WEIGHT: 146 LBS | TEMPERATURE: 97.5 F | HEART RATE: 76 BPM

## 2024-05-03 DIAGNOSIS — E04.2 MULTIPLE THYROID NODULES: ICD-10-CM

## 2024-05-03 DIAGNOSIS — Z63.6 CAREGIVER STRESS: ICD-10-CM

## 2024-05-03 DIAGNOSIS — Z72.820 POOR SLEEP: ICD-10-CM

## 2024-05-03 DIAGNOSIS — E03.9 HYPOTHYROIDISM, UNSPECIFIED TYPE: ICD-10-CM

## 2024-05-03 DIAGNOSIS — E78.5 MILD HYPERLIPIDEMIA: Primary | ICD-10-CM

## 2024-05-03 DIAGNOSIS — E55.9 VITAMIN D DEFICIENCY: ICD-10-CM

## 2024-05-03 DIAGNOSIS — Z00.00 ROUTINE HEALTH MAINTENANCE: ICD-10-CM

## 2024-05-03 PROBLEM — Z86.69 HISTORY OF TINNITUS: Status: ACTIVE | Noted: 2024-05-03

## 2024-05-03 PROCEDURE — 99214 OFFICE O/P EST MOD 30 MIN: CPT | Performed by: FAMILY MEDICINE

## 2024-05-03 RX ORDER — HYDROXYZINE HYDROCHLORIDE 25 MG/1
TABLET, FILM COATED ORAL
Qty: 90 TABLET | Refills: 1 | Status: SHIPPED | OUTPATIENT
Start: 2024-05-03

## 2024-05-03 SDOH — SOCIAL STABILITY - SOCIAL INSECURITY: DEPENDENT RELATIVE NEEDING CARE AT HOME: Z63.6

## 2024-05-03 NOTE — PROGRESS NOTES
"Chief Complaint  Hyperlipidemia (6 month check up follow up labs), Hypothyroidism, and Insomnia (Problems with sleep)    6-month follow-up on hyperlipidemia, hypothyroidism, thyroid nodules, and problems with sleep    Last visit with me in September for chronic med refills with labs  -- All labs stable, no changes made with medicines.  -- Thyroid ultrasound ordered due to history of multiple thyroid nodules.      Overall, continues to do well.  Maintains a very healthy/low-cholesterol diet and active lifestyle.  Watches her grandchild routinely and regular cardio exercise.   Caregiver stress with /Handy, status post CVA, always remains busy but things are stable.   Weight remains stable.  Mood is good.  Poor sleep, only 4 to 6 hours per night.    No new complaints, besides poor sleep.  Blames this on menopause and caregiver stress.  Sometimes difficulty falling asleep, other times early arising and difficulty falling back to sleep.  No medications have been tried, no OTC meds.  History of tinnitus, does not want any thing to make her drowsy.    Otherwise, needs chronic med refills.  She did have repeat fasting lab work done last week, here for review.  Recently completed thyroid ultrasound, here to review.  Compliant with and tolerates meds without side effects, except statin/myalgias.  Only on Synthroid and vitamins.      Review of Systems   Constitutional:  Negative for fever and unexpected weight change.   Respiratory:  Negative for cough and shortness of breath.    Cardiovascular:  Negative for chest pain.        Subjective          Keke Gaitan presents to [facility]    Objective   Vital Signs:   Vitals:    05/03/24 1235   BP: 124/72   BP Location: Right arm   Patient Position: Sitting   Cuff Size: Adult   Pulse: 76   Temp: 97.5 °F (36.4 °C)   SpO2: 99%   Weight: 66.2 kg (146 lb)   Height: 160 cm (63\")      Body mass index is 25.86 kg/m².   Physical Exam  Vitals and " nursing note reviewed.   Constitutional:       Appearance: Normal appearance. She is well-developed.   HENT:      Head: Normocephalic and atraumatic.      Nose: Nose normal.   Eyes:      Conjunctiva/sclera: Conjunctivae normal.      Pupils: Pupils are equal, round, and reactive to light.   Neck:      Thyroid: No thyromegaly.   Cardiovascular:      Rate and Rhythm: Normal rate and regular rhythm.      Heart sounds: Normal heart sounds. No murmur heard.  Pulmonary:      Effort: Pulmonary effort is normal. No respiratory distress.      Breath sounds: Normal breath sounds. No wheezing or rales.   Abdominal:      General: Abdomen is flat. Bowel sounds are normal. There is no distension.      Palpations: Abdomen is soft. There is no hepatomegaly, splenomegaly or mass.      Tenderness: There is no abdominal tenderness. There is no guarding or rebound.      Hernia: No hernia is present.   Musculoskeletal:         General: Normal range of motion.      Cervical back: Normal range of motion and neck supple.      Right lower leg: No edema.      Left lower leg: No edema.   Lymphadenopathy:      Cervical: No cervical adenopathy.   Skin:     General: Skin is warm.   Neurological:      General: No focal deficit present.      Mental Status: She is alert.   Psychiatric:         Mood and Affect: Mood normal.         Behavior: Behavior normal.         Thought Content: Thought content normal.         Judgment: Judgment normal.        Result Review :     Common labs          9/22/2023    10:05 4/26/2024    10:17   Common Labs   Glucose 87     BUN 18     Creatinine 0.74     Sodium 142     Potassium 4.1     Chloride 104     Calcium 10.0     Total Protein 7.5     Albumin 4.9     Total Bilirubin 0.4     Alkaline Phosphatase 84     AST (SGOT) 27     ALT (SGPT) 24     WBC 3.43     Hemoglobin 13.6     Hematocrit 40.9     Platelets 173     Total Cholesterol 222  222    Triglycerides 102  139    HDL Cholesterol 69  61    LDL Cholesterol  135  136       Lipid Panel          9/22/2023    10:05 4/26/2024    10:17   Lipid Panel   Total Cholesterol 222  222    Triglycerides 102  139    HDL Cholesterol 69  61    VLDL Cholesterol 18  25    LDL Cholesterol  135  136    LDL/HDL Ratio 1.92  2.18      TSH          9/22/2023    10:05   TSH   TSH 1.470          Lab Results   Component Value Date    AMDD63DV 52.6 01/30/2023    FOLATE 16.3 11/13/2015        US Thyroid (01/12/2024 11:41) --very stable nodules, no cont fu needed           Assessment and Plan    Diagnoses and all orders for this visit:    1. Mild hyperlipidemia (Primary) -- controlled   Low risk pt. No med needed  Cont with healthy lifestyle ---Needs low-carb/low calorie/low cholesterol diet and increase cardio exercise to greater than 150 minutes weekly   -     Lipid Panel With LDL / HDL Ratio; Future    2. Hypothyroidism, unspecified type -- controlled  No change with Synthroid 75 mcg q am  Recheck TSH in 6 months  -     TSH; Future    3. Multiple thyroid nodules -- very stable, no further fu needed  S/p recent U/S     4. Vitamin D deficiency  -     Vitamin D,25-Hydroxy; Future    5. Poor sleep -- uncontrolled  Likely multifactorial ( menopause and caregiver stress.)  May try OTC Melatonin, if ineffective then start Vistaril 25mg take 1/2 tablet qhs.  Cont with regular exercise.     6. Caregiver stress -- stable, manageable.     7. Routine health maintenance  -     CBC & Differential; Future  -     Comprehensive Metabolic Panel; Future    Other orders  -     hydrOXYzine (ATARAX) 25 MG tablet; One po qhs for sleep  Dispense: 90 tablet; Refill: 1    If doing well then fu in 6months      Follow Up   Return in about 6 months (around 11/3/2024) for Annual physical, lab prior to follow-up.  Patient was given instructions and counseling regarding her condition or for health maintenance advice. Please see specific information pulled into the AVS if appropriate.

## 2024-05-03 NOTE — PATIENT INSTRUCTIONS
May start melatonin for poor sleep    If ineffective may start hydroxyzine, may take 1/2 tablet p.o. nightly    Recommend low fat/low calorie diet and exercise greater than 150 minutes of cardio per week.      Continue current treatment plan.

## 2024-09-13 DIAGNOSIS — E78.5 HYPERLIPIDEMIA, UNSPECIFIED HYPERLIPIDEMIA TYPE: ICD-10-CM

## 2024-09-13 RX ORDER — LEVOTHYROXINE SODIUM 75 UG/1
75 TABLET ORAL DAILY
Qty: 90 TABLET | Refills: 3 | Status: SHIPPED | OUTPATIENT
Start: 2024-09-13

## 2024-10-18 DIAGNOSIS — Z00.00 ROUTINE HEALTH MAINTENANCE: ICD-10-CM

## 2024-10-18 DIAGNOSIS — E03.9 HYPOTHYROIDISM, UNSPECIFIED TYPE: ICD-10-CM

## 2024-10-18 DIAGNOSIS — E55.9 VITAMIN D DEFICIENCY: ICD-10-CM

## 2024-10-18 DIAGNOSIS — E78.5 MILD HYPERLIPIDEMIA: ICD-10-CM

## 2024-10-19 LAB
25(OH)D3+25(OH)D2 SERPL-MCNC: 42.3 NG/ML (ref 30–100)
ALBUMIN SERPL-MCNC: 4.3 G/DL (ref 3.5–5.2)
ALBUMIN/GLOB SERPL: 1.5 G/DL
ALP SERPL-CCNC: 91 U/L (ref 39–117)
ALT SERPL-CCNC: 28 U/L (ref 1–33)
AST SERPL-CCNC: 29 U/L (ref 1–32)
BASOPHILS # BLD AUTO: 0.02 10*3/MM3 (ref 0–0.2)
BASOPHILS NFR BLD AUTO: 0.6 % (ref 0–1.5)
BILIRUB SERPL-MCNC: 0.5 MG/DL (ref 0–1.2)
BUN SERPL-MCNC: 17 MG/DL (ref 6–20)
BUN/CREAT SERPL: 19.8 (ref 7–25)
CALCIUM SERPL-MCNC: 9.6 MG/DL (ref 8.6–10.5)
CHLORIDE SERPL-SCNC: 105 MMOL/L (ref 98–107)
CHOLEST SERPL-MCNC: 210 MG/DL (ref 0–200)
CO2 SERPL-SCNC: 29 MMOL/L (ref 22–29)
CREAT SERPL-MCNC: 0.86 MG/DL (ref 0.57–1)
EGFRCR SERPLBLD CKD-EPI 2021: 78.4 ML/MIN/1.73
EOSINOPHIL # BLD AUTO: 0.12 10*3/MM3 (ref 0–0.4)
EOSINOPHIL NFR BLD AUTO: 3.8 % (ref 0.3–6.2)
ERYTHROCYTE [DISTWIDTH] IN BLOOD BY AUTOMATED COUNT: 12.4 % (ref 12.3–15.4)
GLOBULIN SER CALC-MCNC: 2.9 GM/DL
GLUCOSE SERPL-MCNC: 87 MG/DL (ref 65–99)
HCT VFR BLD AUTO: 42.8 % (ref 34–46.6)
HDLC SERPL-MCNC: 63 MG/DL (ref 40–60)
HGB BLD-MCNC: 13.5 G/DL (ref 12–15.9)
IMM GRANULOCYTES # BLD AUTO: 0 10*3/MM3 (ref 0–0.05)
IMM GRANULOCYTES NFR BLD AUTO: 0 % (ref 0–0.5)
LDLC SERPL CALC-MCNC: 130 MG/DL (ref 0–100)
LDLC/HDLC SERPL: 2.04 {RATIO}
LYMPHOCYTES # BLD AUTO: 1.18 10*3/MM3 (ref 0.7–3.1)
LYMPHOCYTES NFR BLD AUTO: 37.2 % (ref 19.6–45.3)
MCH RBC QN AUTO: 27.8 PG (ref 26.6–33)
MCHC RBC AUTO-ENTMCNC: 31.5 G/DL (ref 31.5–35.7)
MCV RBC AUTO: 88.2 FL (ref 79–97)
MONOCYTES # BLD AUTO: 0.3 10*3/MM3 (ref 0.1–0.9)
MONOCYTES NFR BLD AUTO: 9.5 % (ref 5–12)
NEUTROPHILS # BLD AUTO: 1.55 10*3/MM3 (ref 1.7–7)
NEUTROPHILS NFR BLD AUTO: 48.9 % (ref 42.7–76)
NRBC BLD AUTO-RTO: 0 /100 WBC (ref 0–0.2)
PLATELET # BLD AUTO: 172 10*3/MM3 (ref 140–450)
POTASSIUM SERPL-SCNC: 4.1 MMOL/L (ref 3.5–5.2)
PROT SERPL-MCNC: 7.2 G/DL (ref 6–8.5)
RBC # BLD AUTO: 4.85 10*6/MM3 (ref 3.77–5.28)
SODIUM SERPL-SCNC: 141 MMOL/L (ref 136–145)
TRIGL SERPL-MCNC: 93 MG/DL (ref 0–150)
TSH SERPL DL<=0.005 MIU/L-ACNC: 1.96 UIU/ML (ref 0.27–4.2)
VLDLC SERPL CALC-MCNC: 17 MG/DL (ref 5–40)
WBC # BLD AUTO: 3.17 10*3/MM3 (ref 3.4–10.8)

## 2024-10-25 ENCOUNTER — OFFICE VISIT (OUTPATIENT)
Dept: FAMILY MEDICINE CLINIC | Facility: CLINIC | Age: 59
End: 2024-10-25
Payer: COMMERCIAL

## 2024-10-25 VITALS
TEMPERATURE: 97.7 F | BODY MASS INDEX: 25.73 KG/M2 | HEIGHT: 63 IN | DIASTOLIC BLOOD PRESSURE: 60 MMHG | HEART RATE: 72 BPM | SYSTOLIC BLOOD PRESSURE: 100 MMHG | WEIGHT: 145.2 LBS | OXYGEN SATURATION: 98 %

## 2024-10-25 DIAGNOSIS — D72.819 LEUKOPENIA, UNSPECIFIED TYPE: ICD-10-CM

## 2024-10-25 DIAGNOSIS — E78.5 MILD HYPERLIPIDEMIA: ICD-10-CM

## 2024-10-25 DIAGNOSIS — E03.9 HYPOTHYROIDISM, UNSPECIFIED TYPE: ICD-10-CM

## 2024-10-25 DIAGNOSIS — Z00.00 WELLNESS EXAMINATION: Primary | ICD-10-CM

## 2024-10-25 DIAGNOSIS — E55.9 VITAMIN D DEFICIENCY: ICD-10-CM

## 2024-10-25 DIAGNOSIS — Z72.820 POOR SLEEP: ICD-10-CM

## 2024-10-25 DIAGNOSIS — Z63.6 CAREGIVER STRESS: ICD-10-CM

## 2024-10-25 PROBLEM — R10.30 LOWER ABDOMINAL PAIN: Status: RESOLVED | Noted: 2017-10-19 | Resolved: 2024-10-25

## 2024-10-25 PROCEDURE — 99396 PREV VISIT EST AGE 40-64: CPT | Performed by: FAMILY MEDICINE

## 2024-10-25 SDOH — SOCIAL STABILITY - SOCIAL INSECURITY: DEPENDENT RELATIVE NEEDING CARE AT HOME: Z63.6

## 2024-10-25 NOTE — PATIENT INSTRUCTIONS
Needs flu shot, COVID shot, , and shingles shot    Continue to improve upon healthy lifestyle ---Needs low-carb/low calorie/low cholesterol diet and increase cardio exercise to greater than 150 minutes weekly

## 2024-10-25 NOTE — PROGRESS NOTES
Chief Complaint  Annual Exam (Yearly wellness follow up labs has GYN for all female wellness had cologuard last year), Hyperlipidemia, and Hypothyroidism    NEEDS ANNUAL WELLNESS  And  6-month follow-up on poor sleep, caregiver stress, hypothyroidism, and mild hyperlipidemia     Last visit with me in May 2024 for chronic med refills with labs and uncontrolled stressors/poor sleep  -- All labs stable, no med changes made  -- Started on Vistaril 25 mg nightly as needed poor sleep, may take 1/2 tablet every 8 hours as needed       Continues to do well.  She did start the Vistaril as needed for sleep, this is helping.  Maintains a very healthy/low-cholesterol diet and active lifestyle.  Watches her grandchild routinely and regular cardio exercise.  Weight remains stable.  Mood is good.  Sleeping fine.     No new complaints or concerns today.  Uncertain about needed refills.  She did have repeat fasting lab work done last week, here for review.  Compliant with and tolerates meds without side effects, except statin/myalgias.  Only on Synthroid and vitamins.     Routine health maintenance/screening test:  PAP --- has gynecologist, up-to-date  MAMMO --- done at gynecologist office, up-to-date  DEXA --- Per gynecologist  Colorectal Screen --- Cologuard done December 2023, negative  Vaccines --- not up-to-date  Smoking/ETOH Status --- non-smoker, no EtOH  Dentist, Eye Exam, Derm --- maintains regular dental visits, eye exam, no Derm  Diet/Exercise --- maintains a healthy low-cholesterol diet and regular cardio exercise  Pertinent FH --- significant for CVA/parent late 70s, negative for colon cancer, premature CAD          Review of Systems   Constitutional:  Negative for fever and unexpected weight change.   Respiratory:  Negative for cough and shortness of breath.    Cardiovascular:  Negative for chest pain.        Subjective          Keke Gaitan presents to [facility]    Objective  "  Vital Signs:   Vitals:    10/25/24 1250   BP: 100/60   BP Location: Right arm   Patient Position: Sitting   Cuff Size: Adult   Pulse: 72   Temp: 97.7 °F (36.5 °C)   SpO2: 98%   Weight: 65.9 kg (145 lb 3.2 oz)   Height: 160 cm (63\")      Body mass index is 25.72 kg/m².   Physical Exam  Vitals and nursing note reviewed.   Constitutional:       Appearance: Normal appearance. She is well-developed.   HENT:      Head: Normocephalic and atraumatic.      Nose: Nose normal.   Eyes:      Conjunctiva/sclera: Conjunctivae normal.      Pupils: Pupils are equal, round, and reactive to light.   Neck:      Thyroid: No thyromegaly.   Cardiovascular:      Rate and Rhythm: Normal rate and regular rhythm.      Heart sounds: Normal heart sounds. No murmur heard.  Pulmonary:      Effort: Pulmonary effort is normal. No respiratory distress.      Breath sounds: Normal breath sounds. No wheezing or rales.   Abdominal:      General: Abdomen is flat. Bowel sounds are normal. There is no distension.      Palpations: Abdomen is soft. There is no hepatomegaly, splenomegaly or mass.      Tenderness: There is no abdominal tenderness. There is no guarding or rebound.      Hernia: No hernia is present.   Musculoskeletal:         General: Normal range of motion.      Cervical back: Normal range of motion and neck supple.      Right lower leg: No edema.      Left lower leg: No edema.   Lymphadenopathy:      Cervical: No cervical adenopathy.   Skin:     General: Skin is warm.      Comments: No dysplastic or abnormal lesions  Multiple light brown stuck on like plaques on trunk   Neurological:      General: No focal deficit present.      Mental Status: She is alert.   Psychiatric:         Mood and Affect: Mood normal.         Behavior: Behavior normal.         Thought Content: Thought content normal.         Judgment: Judgment normal.        Result Review :     Common labs          4/26/2024    10:17 10/18/2024    10:34   Common Labs   Glucose  87  "   BUN  17    Creatinine  0.86    Sodium  141    Potassium  4.1    Chloride  105    Calcium  9.6    Total Protein  7.2    Albumin  4.3    Total Bilirubin  0.5    Alkaline Phosphatase  91    AST (SGOT)  29    ALT (SGPT)  28    WBC  3.17    Hemoglobin  13.5    Hematocrit  42.8    Platelets  172    Total Cholesterol 222  210    Triglycerides 139  93    HDL Cholesterol 61  63    LDL Cholesterol  136  130      Lipid Panel          4/26/2024    10:17 10/18/2024    10:34   Lipid Panel   Total Cholesterol 222  210    Triglycerides 139  93    HDL Cholesterol 61  63    VLDL Cholesterol 25  17    LDL Cholesterol  136  130    LDL/HDL Ratio 2.18  2.04      TSH          10/18/2024    10:34   TSH   TSH 1.960            Lab Results   Component Value Date    YXSR71WB 42.3 10/18/2024    FOLATE 16.3 11/13/2015                   Assessment and Plan    Diagnoses and all orders for this visit:    1. Wellness examination (Primary) --within normal limits  Has GYN, Pap and mammogram up-to-date.  All screening up-to-date except for needs shingles vaccine, COVID-vaccine, and influenza vaccines.  Otherwise, continue to improve upon healthy lifestyle --Needs low-carb/low calorie/low cholesterol diet and increase cardio exercise to greater than 150 minutes weekly     2. Mild hyperlipidemia --stable  Low risk patient.  No medication needed.  Needs low-carb/low calorie/low cholesterol diet and increase cardio exercise to greater than 150 minutes weekly     3. Hypothyroidism, unspecified type --stable  TSH up-to-date and therapeutic.  No change with Synthroid 75 mcg every morning    4. Vitamin D deficiency --controlled    5. Poor sleep --doing better with as needed Vistaril, will refill upon request    6. Caregiver stress --stable    7. Leukopenia, unspecified type --stable, chronic.  Asymptomatic.  Review of records shows stability with WBC count times many years, always on low end of normal.    If doing well may follow-up on yearly basis.   Otherwise needs to be seen sooner        Follow Up   Return in about 1 year (around 10/25/2025) for Annual physical.  Patient was given instructions and counseling regarding her condition or for health maintenance advice. Please see specific information pulled into the AVS if appropriate.

## 2024-11-08 ENCOUNTER — HOSPITAL ENCOUNTER (EMERGENCY)
Facility: HOSPITAL | Age: 59
Discharge: HOME | End: 2024-11-09
Attending: EMERGENCY MEDICINE
Payer: COMMERCIAL

## 2024-11-08 ENCOUNTER — APPOINTMENT (OUTPATIENT)
Dept: CT IMAGING | Facility: HOSPITAL | Age: 59
End: 2024-11-08
Payer: COMMERCIAL

## 2024-11-08 ENCOUNTER — APPOINTMENT (OUTPATIENT)
Dept: GENERAL RADIOLOGY | Facility: HOSPITAL | Age: 59
End: 2024-11-08
Payer: COMMERCIAL

## 2024-11-08 VITALS
BODY MASS INDEX: 25.69 KG/M2 | OXYGEN SATURATION: 99 % | DIASTOLIC BLOOD PRESSURE: 66 MMHG | SYSTOLIC BLOOD PRESSURE: 128 MMHG | TEMPERATURE: 97.4 F | RESPIRATION RATE: 18 BRPM | HEART RATE: 90 BPM | HEIGHT: 63 IN | WEIGHT: 145 LBS

## 2024-11-08 DIAGNOSIS — M25.511 ACUTE PAIN OF RIGHT SHOULDER: ICD-10-CM

## 2024-11-08 DIAGNOSIS — M79.671 RIGHT FOOT PAIN: ICD-10-CM

## 2024-11-08 DIAGNOSIS — S16.1XXA STRAIN OF NECK MUSCLE, INITIAL ENCOUNTER: Primary | ICD-10-CM

## 2024-11-08 DIAGNOSIS — R07.89 RIGHT-SIDED CHEST WALL PAIN: ICD-10-CM

## 2024-11-08 DIAGNOSIS — R10.9 RIGHT SIDED ABDOMINAL PAIN: ICD-10-CM
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
## 2024-11-08 PROCEDURE — 73030 X-RAY EXAM OF SHOULDER: CPT

## 2024-11-08 PROCEDURE — 72131 CT LUMBAR SPINE W/O DYE: CPT

## 2024-11-08 PROCEDURE — 71250 CT THORAX DX C-: CPT

## 2024-11-08 PROCEDURE — 70450 CT HEAD/BRAIN W/O DYE: CPT

## 2024-11-08 PROCEDURE — 72125 CT NECK SPINE W/O DYE: CPT

## 2024-11-08 PROCEDURE — 73630 X-RAY EXAM OF FOOT: CPT

## 2024-11-08 PROCEDURE — 99284 EMERGENCY DEPT VISIT MOD MDM: CPT

## 2024-11-08 PROCEDURE — 74176 CT ABD & PELVIS W/O CONTRAST: CPT

## 2024-11-08 RX ORDER — HYDROCODONE BITARTRATE AND ACETAMINOPHEN 5; 325 MG/1; MG/1
1 TABLET ORAL ONCE
Status: DISCONTINUED | OUTPATIENT
Start: 2024-11-08 | End: 2024-11-08

## 2024-11-08 RX ORDER — ACETAMINOPHEN 500 MG
1000 TABLET ORAL ONCE
Status: COMPLETED | OUTPATIENT
Start: 2024-11-08 | End: 2024-11-08

## 2024-11-08 RX ADMIN — ACETAMINOPHEN 1000 MG: 500 TABLET ORAL at 21:33

## 2024-11-09 RX ORDER — METHOCARBAMOL 500 MG/1
500 TABLET, FILM COATED ORAL EVERY 6 HOURS PRN
Qty: 20 TABLET | Refills: 0 | Status: SHIPPED | OUTPATIENT
Start: 2024-11-09

## 2024-11-09 RX ORDER — IBUPROFEN 600 MG/1
600 TABLET, FILM COATED ORAL EVERY 8 HOURS PRN
Qty: 20 TABLET | Refills: 0 | Status: SHIPPED | OUTPATIENT
Start: 2024-11-09 | End: 2024-11-12 | Stop reason: SDUPTHER
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
## 2024-11-12 ENCOUNTER — OFFICE VISIT (OUTPATIENT)
Dept: FAMILY MEDICINE CLINIC | Facility: CLINIC | Age: 59
End: 2024-11-12
Payer: COMMERCIAL

## 2024-11-12 VITALS
HEIGHT: 63 IN | TEMPERATURE: 97.7 F | HEART RATE: 54 BPM | DIASTOLIC BLOOD PRESSURE: 66 MMHG | OXYGEN SATURATION: 97 % | BODY MASS INDEX: 25.69 KG/M2 | SYSTOLIC BLOOD PRESSURE: 110 MMHG | WEIGHT: 145 LBS

## 2024-11-12 DIAGNOSIS — S06.0X0A CONCUSSION WITHOUT LOSS OF CONSCIOUSNESS, INITIAL ENCOUNTER: ICD-10-CM

## 2024-11-12 DIAGNOSIS — S16.1XXA ACUTE STRAIN OF NECK MUSCLE, INITIAL ENCOUNTER: ICD-10-CM

## 2024-11-12 DIAGNOSIS — H93.8X3 POPPING OF BOTH EARS: ICD-10-CM

## 2024-11-12 DIAGNOSIS — F41.8 SITUATIONAL ANXIETY: ICD-10-CM
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
## 2024-11-12 DIAGNOSIS — R25.1 SHAKINESS: ICD-10-CM

## 2024-11-12 DIAGNOSIS — M25.511 ACUTE PAIN OF RIGHT SHOULDER: ICD-10-CM

## 2024-11-12 DIAGNOSIS — M54.50 RIGHT LUMBAR PAIN: ICD-10-CM

## 2024-11-12 DIAGNOSIS — M25.512 ACUTE PAIN OF LEFT SHOULDER: ICD-10-CM

## 2024-11-12 DIAGNOSIS — R91.8 MULTIPLE PULMONARY NODULES: ICD-10-CM

## 2024-11-12 DIAGNOSIS — T07.XXXA MULTIPLE INJURIES: ICD-10-CM

## 2024-11-12 RX ORDER — METHYLPREDNISOLONE 4 MG/1
TABLET ORAL
Qty: 1 EACH | Refills: 0 | Status: CANCELLED | OUTPATIENT
Start: 2024-11-12

## 2024-11-12 RX ORDER — IBUPROFEN 600 MG/1
TABLET, FILM COATED ORAL
Qty: 45 TABLET | Refills: 0 | Status: SHIPPED | OUTPATIENT
Start: 2024-11-12 | End: 2024-11-13 | Stop reason: SDUPTHER

## 2024-11-12 RX ORDER — HYDROXYZINE HYDROCHLORIDE 25 MG/1
TABLET, FILM COATED ORAL
Qty: 90 TABLET | Refills: 1 | Status: CANCELLED | OUTPATIENT
Start: 2024-11-12
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
## 2024-11-13 ENCOUNTER — TELEPHONE (OUTPATIENT)
Dept: FAMILY MEDICINE CLINIC | Facility: CLINIC | Age: 59
End: 2024-11-13
Payer: COMMERCIAL

## 2024-11-13 RX ORDER — IBUPROFEN 600 MG/1
TABLET, FILM COATED ORAL
Qty: 45 TABLET | Refills: 0 | Status: SHIPPED | OUTPATIENT
Start: 2024-11-13

## 2024-11-13 NOTE — TELEPHONE ENCOUNTER
Patient contacted the pharmacy about her Ibuprofen Rx and they had not received anything from us. Patient was in the office yesterday and expected the Rx to be sent to pharmacy electronically.    Sameer, Rx was printed in error, are you allowed to call in? If not, will you forward to the physician pool since Dr. Erazo will not return until Friday and patient will be out of supply from ER?    Thanks!

## 2024-11-14 PROBLEM — F41.8 SITUATIONAL ANXIETY: Status: ACTIVE | Noted: 2024-11-14

## 2024-11-22 ENCOUNTER — OFFICE VISIT (OUTPATIENT)
Dept: FAMILY MEDICINE CLINIC | Facility: CLINIC | Age: 59
End: 2024-11-22
Payer: COMMERCIAL

## 2024-11-22 ENCOUNTER — TELEPHONE (OUTPATIENT)
Dept: FAMILY MEDICINE CLINIC | Facility: CLINIC | Age: 59
End: 2024-11-22
Payer: COMMERCIAL

## 2024-11-22 VITALS
BODY MASS INDEX: 25.69 KG/M2 | TEMPERATURE: 97.1 F | DIASTOLIC BLOOD PRESSURE: 80 MMHG | WEIGHT: 145 LBS | HEART RATE: 82 BPM | SYSTOLIC BLOOD PRESSURE: 120 MMHG | OXYGEN SATURATION: 96 % | HEIGHT: 63 IN

## 2024-11-22 DIAGNOSIS — S06.0X0D CONCUSSION WITHOUT LOSS OF CONSCIOUSNESS, SUBSEQUENT ENCOUNTER: ICD-10-CM

## 2024-11-22 DIAGNOSIS — J30.9 ALLERGIC RHINITIS, UNSPECIFIED SEASONALITY, UNSPECIFIED TRIGGER: ICD-10-CM

## 2024-11-22 DIAGNOSIS — M54.16 ACUTE LEFT LUMBAR RADICULOPATHY: Primary | ICD-10-CM

## 2024-11-22 DIAGNOSIS — H69.93 ACUTE DYSFUNCTION OF EUSTACHIAN TUBE, BILATERAL: ICD-10-CM

## 2024-11-22 DIAGNOSIS — M51.369 L4-L5 DISC BULGE: ICD-10-CM

## 2024-11-22 RX ORDER — IBUPROFEN 600 MG/1
TABLET, FILM COATED ORAL
Qty: 30 TABLET | Refills: 0 | Status: SHIPPED | OUTPATIENT
Start: 2024-11-22
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
## 2024-12-06 ENCOUNTER — TELEPHONE (OUTPATIENT)
Dept: FAMILY MEDICINE CLINIC | Facility: CLINIC | Age: 59
End: 2024-12-06
Payer: COMMERCIAL
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
## 2024-12-06 NOTE — TELEPHONE ENCOUNTER
Patient canceled same day, patient is curious if she should continue the tylenol/enrique regimen past 10 days, continue to use heat therapy and/or follow up next week?

## 2024-12-13 ENCOUNTER — OFFICE VISIT (OUTPATIENT)
Dept: FAMILY MEDICINE CLINIC | Facility: CLINIC | Age: 59
End: 2024-12-13
Payer: COMMERCIAL

## 2024-12-13 VITALS
HEIGHT: 63 IN | WEIGHT: 144.6 LBS | BODY MASS INDEX: 25.62 KG/M2 | SYSTOLIC BLOOD PRESSURE: 126 MMHG | TEMPERATURE: 97.5 F | OXYGEN SATURATION: 97 % | DIASTOLIC BLOOD PRESSURE: 70 MMHG | HEART RATE: 86 BPM
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
## 2024-12-13 DIAGNOSIS — M54.16 ACUTE LEFT LUMBAR RADICULOPATHY: ICD-10-CM

## 2024-12-13 DIAGNOSIS — H93.13 TINNITUS OF BOTH EARS: ICD-10-CM

## 2024-12-13 DIAGNOSIS — H93.8X3 POPPING OF BOTH EARS: ICD-10-CM

## 2024-12-13 DIAGNOSIS — R20.0 NUMBNESS AND TINGLING OF LEFT LEG: ICD-10-CM

## 2024-12-13 DIAGNOSIS — R20.2 NUMBNESS AND TINGLING OF LEFT LEG: ICD-10-CM

## 2024-12-13 DIAGNOSIS — F41.8 SITUATIONAL ANXIETY: ICD-10-CM

## 2024-12-13 DIAGNOSIS — M54.50 LUMBAR PAIN: ICD-10-CM
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
## 2024-12-13 RX ORDER — METHOCARBAMOL 500 MG/1
500 TABLET, FILM COATED ORAL EVERY 6 HOURS PRN
Qty: 20 TABLET | Refills: 0 | Status: SHIPPED | OUTPATIENT
Start: 2024-12-13

## 2024-12-13 RX ORDER — METHOCARBAMOL 500 MG/1
500 TABLET, FILM COATED ORAL EVERY 6 HOURS PRN
Qty: 20 TABLET | Refills: 0 | Status: SHIPPED | OUTPATIENT
Start: 2024-12-13 | End: 2024-12-13 | Stop reason: SDUPTHER

## 2024-12-13 RX ORDER — IBUPROFEN 600 MG/1
TABLET, FILM COATED ORAL
Qty: 30 TABLET | Refills: 1 | Status: SHIPPED | OUTPATIENT
Start: 2024-12-13

## 2024-12-13 RX ORDER — LIDOCAINE 50 MG/G
1 PATCH TOPICAL EVERY 24 HOURS
Qty: 60 PATCH | Refills: 5 | Status: SHIPPED | OUTPATIENT
Start: 2024-12-13

## 2024-12-13 RX ORDER — DULOXETIN HYDROCHLORIDE 30 MG/1
30 CAPSULE, DELAYED RELEASE ORAL DAILY
Qty: 30 CAPSULE | Refills: 2 | Status: CANCELLED
Start: 2024-12-13

## 2024-12-13 NOTE — TELEPHONE ENCOUNTER
Caller: Keke Gaitan    Relationship: Self    Best call back number:[phone]      Requested Prescriptions:   Requested Prescriptions     Pending Prescriptions Disp Refills    methocarbamol (ROBAXIN) 500 MG tablet 20 tablet 0     Sig: Take 1 tablet by mouth Every 6 (Six) Hours As Needed (muscle pain/spasms).        Pharmacy where request should be sent: [id number] [address] AT [facility] [phone] [facility] [phone]      Last office visit with prescribing clinician: 12/13/2024   Last telemedicine visit with prescribing clinician: Visit date not found   Next office visit with prescribing clinician: 1/31/2025     Additional details provided by patient:     Does the patient have less than a 3 day supply:  [x] Yes  [] No    Would you like a call back once the refill request has been completed: [] Yes [x] No    If the office needs to give you a call back, can they leave a voicemail: [] Yes [x] No    Gabriela Brown Rep   12/13/24 12:02 EST
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
## 2024-12-13 NOTE — TELEPHONE ENCOUNTER
SHE COULD NOT GET THE 5% LIDOCAINE SO CAN SHE JUST GET LIDOCAINE PATCHES? PLEASE CALL AND ADVISE [phone]

## 2024-12-13 NOTE — PATIENT INSTRUCTIONS
Needs MRI of lumbar spine and brain, referrals have been placed    May restart muscle relaxer, Robaxin as needed    Continue ibuprofen and Tylenol as prescribed    Add Lidoderm patch every 12 hours as needed back pain    If symptoms are worse, weakness, dragging the leg, loss of bowel or bladder then to ER

## 2024-12-14 PROBLEM — H93.13 TINNITUS OF BOTH EARS: Status: ACTIVE | Noted: 2024-12-14

## 2024-12-14 PROBLEM — M54.16 ACUTE LEFT LUMBAR RADICULOPATHY: Status: ACTIVE | Noted: 2024-12-14

## 2024-12-14 PROBLEM — M54.50 LUMBAR PAIN: Status: ACTIVE | Noted: 2024-12-14

## 2025-01-23 ENCOUNTER — TELEPHONE (OUTPATIENT)
Dept: FAMILY MEDICINE CLINIC | Facility: CLINIC | Age: 60
End: 2025-01-23
Payer: COMMERCIAL

## 2025-01-23 NOTE — TELEPHONE ENCOUNTER
Kristofer from [employer] is calling for the MRI of the Spine and MRI of the Brain. They are both still pending via the INS. However, he stated that most likely the MRI of the spine will require physical therapy and the MRI of the brain typically needs notes including nerve damage.     Kristofer is calling to see if we may move these visits out to wait for the official INS response. Please call today if possible.    Kristofer is aware Dr. Erazo and MA are out of the office on Thursdays.

## 2025-01-24 ENCOUNTER — HOSPITAL ENCOUNTER (OUTPATIENT)
Dept: MRI IMAGING | Facility: HOSPITAL | Age: 60
Discharge: HOME | End: 2025-01-24
Payer: COMMERCIAL
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
## 2025-01-24 DIAGNOSIS — H93.8X3 POPPING OF BOTH EARS: ICD-10-CM

## 2025-01-24 DIAGNOSIS — M54.50 LUMBAR PAIN: ICD-10-CM

## 2025-01-24 DIAGNOSIS — H93.13 TINNITUS OF BOTH EARS: ICD-10-CM

## 2025-01-24 DIAGNOSIS — M54.16 ACUTE LEFT LUMBAR RADICULOPATHY: ICD-10-CM

## 2025-01-24 DIAGNOSIS — R20.2 NUMBNESS AND TINGLING OF LEFT LEG: ICD-10-CM

## 2025-01-24 DIAGNOSIS — R20.0 NUMBNESS AND TINGLING OF LEFT LEG: ICD-10-CM

## 2025-01-24 PROCEDURE — 25510000002 GADOBENATE DIMEGLUMINE 529 MG/ML SOLUTION: Performed by: FAMILY MEDICINE

## 2025-01-24 PROCEDURE — 72158 MRI LUMBAR SPINE W/O & W/DYE: CPT

## 2025-01-24 PROCEDURE — 70553 MRI BRAIN STEM W/O & W/DYE: CPT

## 2025-01-24 PROCEDURE — A9577 INJ MULTIHANCE: HCPCS | Performed by: FAMILY MEDICINE

## 2025-01-24 RX ADMIN — GADOBENATE DIMEGLUMINE 13 ML: 529 INJECTION, SOLUTION INTRAVENOUS at 10:37

## 2025-01-27 NOTE — PROGRESS NOTES
Some mild fluid in mastoid only.  Also some paranasal sinus disease.  Otherwise normal MRI of brain.  Follow-up as planned

## 2025-01-31 ENCOUNTER — OFFICE VISIT (OUTPATIENT)
Dept: FAMILY MEDICINE CLINIC | Facility: CLINIC | Age: 60
End: 2025-01-31
Payer: COMMERCIAL

## 2025-01-31 VITALS
HEIGHT: 63 IN | TEMPERATURE: 97.5 F | HEART RATE: 76 BPM | BODY MASS INDEX: 25.94 KG/M2 | WEIGHT: 146.4 LBS | OXYGEN SATURATION: 96 % | SYSTOLIC BLOOD PRESSURE: 112 MMHG | DIASTOLIC BLOOD PRESSURE: 70 MMHG

## 2025-01-31 DIAGNOSIS — M54.16 ACUTE LEFT LUMBAR RADICULOPATHY: ICD-10-CM

## 2025-01-31 DIAGNOSIS — R20.0 NUMBNESS AND TINGLING OF LEFT LEG: ICD-10-CM

## 2025-01-31 DIAGNOSIS — R20.0 NUMBNESS AND TINGLING: ICD-10-CM

## 2025-01-31 DIAGNOSIS — H93.13 TINNITUS OF BOTH EARS: ICD-10-CM

## 2025-01-31 DIAGNOSIS — F41.8 SITUATIONAL ANXIETY: ICD-10-CM

## 2025-01-31 DIAGNOSIS — J34.9 PARANASAL SINUS DISEASE: ICD-10-CM

## 2025-01-31 DIAGNOSIS — R20.2 NUMBNESS AND TINGLING: ICD-10-CM
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
## 2025-01-31 DIAGNOSIS — M51.369 L4-L5 DISC BULGE: ICD-10-CM

## 2025-01-31 DIAGNOSIS — G96.191 TARLOV CYST: ICD-10-CM

## 2025-01-31 DIAGNOSIS — R20.2 NUMBNESS AND TINGLING OF LEFT LEG: ICD-10-CM

## 2025-01-31 PROCEDURE — 99215 OFFICE O/P EST HI 40 MIN: CPT | Performed by: FAMILY MEDICINE

## 2025-01-31 RX ORDER — IBUPROFEN 600 MG/1
TABLET, FILM COATED ORAL
Qty: 90 TABLET | Refills: 1 | Status: SHIPPED | OUTPATIENT
Start: 2025-01-31

## 2025-01-31 RX ORDER — METHOCARBAMOL 500 MG/1
500 TABLET, FILM COATED ORAL EVERY 6 HOURS PRN
Qty: 75 TABLET | Refills: 1 | Status: SHIPPED | OUTPATIENT
Start: 2025-01-31
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
## 2025-01-31 NOTE — PATIENT INSTRUCTIONS
May continue ibuprofen and Robaxin as needed    Referral to neurosurgeon, need their opinion    Referral to Dr. Urbano for persistent tinnitus

## 2025-02-07 ENCOUNTER — TELEPHONE (OUTPATIENT)
Dept: FAMILY MEDICINE CLINIC | Facility: CLINIC | Age: 60
End: 2025-02-07
Payer: COMMERCIAL

## 2025-02-18 ENCOUNTER — TELEPHONE (OUTPATIENT)
Dept: NEUROSURGERY | Facility: CLINIC | Age: 60
End: 2025-02-18
Payer: COMMERCIAL

## 2025-02-18 NOTE — TELEPHONE ENCOUNTER
02/18/25 I called pt and left a voice mail to move her office appt on 02/21/25 to either 08:45 am or 1030 a.m.    ++: please reschedule to eariler time. Thanks!+++

## 2025-02-18 NOTE — TELEPHONE ENCOUNTER
Name: Keke Gaitan    Relationship: Self    Best Callback Number: [phone]    [employer]UB PROVIDED THE RELAY MESSAGE FROM THE OFFICE   PATIENT HAS FURTHER QUESTIONS AND WOULD LIKE A CALL BACK    ADDITIONAL INFORMATION: PATIENT HAS A MAMMOGRAM SCHEDULED AT 10:45 AT [facility] ([place]) ON THE SAME DAY AS HER APPT; THE 1:45 APPT WORKS BEST FOR HER TO ENSURE SHE IS ABLE TO MAKE IT TO BOTH OF THESE APPTS ON TIME. SHE STATES THAT 8:45 MAY BE DOABLE, BUT WISHES TO CONFIRM THAT SHE WOULD BE ABLE TO MAKE IT TO HER 10:45 MAMMOGRAM. PLEASE RETURN HER CALL TO [facility].
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
## 2025-02-21 ENCOUNTER — OFFICE VISIT (OUTPATIENT)
Dept: NEUROSURGERY | Facility: CLINIC | Age: 60
End: 2025-02-21
Payer: COMMERCIAL

## 2025-02-21 ENCOUNTER — TELEPHONE (OUTPATIENT)
Dept: NEUROSURGERY | Facility: CLINIC | Age: 60
End: 2025-02-21

## 2025-02-21 VITALS
DIASTOLIC BLOOD PRESSURE: 70 MMHG | BODY MASS INDEX: 25.87 KG/M2 | HEART RATE: 69 BPM | WEIGHT: 146 LBS | OXYGEN SATURATION: 99 % | HEIGHT: 63 IN | TEMPERATURE: 97.8 F | SYSTOLIC BLOOD PRESSURE: 110 MMHG

## 2025-02-21 DIAGNOSIS — M54.50 CHRONIC BILATERAL LOW BACK PAIN WITHOUT SCIATICA: Primary | ICD-10-CM

## 2025-02-21 DIAGNOSIS — G89.29 CHRONIC BILATERAL LOW BACK PAIN WITHOUT SCIATICA: Primary | ICD-10-CM

## 2025-02-21 DIAGNOSIS — S39.012A STRAIN OF LUMBAR REGION, INITIAL ENCOUNTER: ICD-10-CM

## 2025-02-21 PROCEDURE — 99204 OFFICE O/P NEW MOD 45 MIN: CPT | Performed by: NEUROLOGICAL SURGERY

## 2025-02-21 NOTE — TELEPHONE ENCOUNTER
02/21/25 in[place]annerisela from [facility].    ARACELY FROM [facility] CALLED: THEY DO NOT TAKE PT'S PRIMARY INSURANCE WELLCARE AND PT WILL NEED TO BE REFERRED TO SOMEWHERE ELSE.           I called pt's coverage [facility] ([phone]). Coverage is in-network with  medical group neurosurgery Dr JOSIAS Mcginnis.  Physical Therapy coral who is in-network with pt's coverage:  (1) [facility] physical therapy. I faxed referral and supporting documents to fax number [phone]  (2) [facility], Richa Myers PT at [address] [address]. I faxed referral and supporting documents to fax number [phone]    Pain Management in-network with pt's coverage:    (1) [facility], Dr Bonita Eubanks MD,  At [address] [address].  I faxed referral and supporting documents to fax number [phone]    (2) [facility], johnnie English DO, MS, at [address] [place] [address]  I faxed referral and supporting documents to [phone]    Both pain management and physical therapy required authorization.  Call [phone] (choose option 3).    Ref number for call: [id number]    Any information that I obtained is scanned to pt's media.  All referrals and supporting documents have been faxed to patients primary care physician at [phone]    I called patient to inform. My call went to voicemail.       +++/caller. Ok to communicate with pt. Thanks!++++

## 2025-02-21 NOTE — TELEPHONE ENCOUNTER
02/21/25 I called pt, again. My call went to voice mail.    +++/caller please read my detailed note to her. Thanks!++

## 2025-02-21 NOTE — TELEPHONE ENCOUNTER
PATIENT CALLED AND SAID SHE WAS TOLD TO CALL AND ASK FOR ISADORA.  PLEASE CALL HER BACK. [phone]  TRIED TO W/T, NO ANSWER.

## 2025-02-21 NOTE — TELEPHONE ENCOUNTER
PT CALLED BACK: SHE WOULD LIKE ISADORA TO GIVE HER A CALL BACK. SHE CALLED HER INSURANCE AND STATES [employer] IS IN NETWORK FOR  MYAH ATKINS ON [address] AND THEIR PHONE NUMBER [phone].    PT CONTACT: [phone]  BEST TIME TO CALL: ANYTIME

## 2025-02-21 NOTE — TELEPHONE ENCOUNTER
ARACELY FROM [facility] CALLED: THEY DO NOT TAKE PT'S PRIMARY INSURANCE WELLCARE AND PT WILL NEED TO BE REFERRED TO SOMEWHERE ELSE.

## 2025-02-24 ENCOUNTER — TELEPHONE (OUTPATIENT)
Dept: NEUROSURGERY | Facility: CLINIC | Age: 60
End: 2025-02-24
Payer: COMMERCIAL
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
## 2025-03-10 ENCOUNTER — TELEPHONE (OUTPATIENT)
Dept: NEUROSURGERY | Facility: CLINIC | Age: 60
End: 2025-03-10
Payer: COMMERCIAL

## 2025-03-10 NOTE — TELEPHONE ENCOUNTER
03/10/25 FAXED RECEIVED FROM [facility] PATIENT DECLINED SETTING UP AN APPOINTMENT WITH OUR OFFICE.  I CALLED LEFT A VOICE MAIL [phone] AS TO WHY SHE DECLINED.  I SCANNED DOCUMENT TO MEDIA  REFERRAL FOR PAIN MANAGEMENT WAS PLACED ON 02.21.2025   BY DR RICHARD SIFUENTES.  ++HUB: OK TO COMMUNICATE AND DOCUMENT. THANKS!++

## 2025-04-08 ENCOUNTER — TELEPHONE (OUTPATIENT)
Dept: FAMILY MEDICINE CLINIC | Facility: CLINIC | Age: 60
End: 2025-04-08

## 2025-04-08 NOTE — TELEPHONE ENCOUNTER
Caller: Keke Gaitan    Relationship: Self    Best call back number: [phone]     What does billing need from the patient: PATIENT HAS SOME QUESTIONS ABOUT CHARGES ON HER BILL THAT SHOULD BE PAID BY NOW. PLEASE CALL TO ADVISE.

## 2025-04-21 ENCOUNTER — OFFICE VISIT (OUTPATIENT)
Dept: PAIN MEDICINE | Facility: CLINIC | Age: 60
End: 2025-04-21
Payer: COMMERCIAL

## 2025-04-21 VITALS
OXYGEN SATURATION: 98 % | HEART RATE: 81 BPM | BODY MASS INDEX: 26.4 KG/M2 | WEIGHT: 149 LBS | RESPIRATION RATE: 18 BRPM | TEMPERATURE: 98.5 F | SYSTOLIC BLOOD PRESSURE: 116 MMHG | DIASTOLIC BLOOD PRESSURE: 77 MMHG | HEIGHT: 63 IN

## 2025-04-21 DIAGNOSIS — M51.26 DISPLACEMENT OF LUMBAR INTERVERTEBRAL DISC WITHOUT MYELOPATHY: ICD-10-CM

## 2025-04-21 DIAGNOSIS — M54.16 LUMBAR RADICULOPATHY: Primary | ICD-10-CM

## 2025-04-21 PROCEDURE — 99204 OFFICE O/P NEW MOD 45 MIN: CPT | Performed by: ANESTHESIOLOGY

## 2025-04-21 NOTE — PATIENT INSTRUCTIONS
---  Indications for epidural injection:  Plan is to proceed with epidural at the appropriate level.  If the patient receives significant pain reduction and improvement in function and the plan will be to repeat the epidural when the pain worsens.  If a second epidural provides at least 6 weeks of sustained improvement that includes both pain reduction and improvement in function then an epidural injection could be repeated once again at the same level.  This is a mutual decision between the clinician and the patient that includes discussions including risks and benefits in detail as well as alternative therapies.  Patient's questions were answered to their satisfaction and to their understanding.  ---      Risk of serious complications from epidural injections:  best estimates of incidence (updated September 2021)  - These statistics are derived from several academic publications.    - The disclaimer is that this may not be a completely exhaustive list, and this does not address common side effects from procedures such as postanesthesia care unit nausea, procedural site soreness, numbness from local anesthetic numbing medication, etc.    - This list does comprehensively hope to address reasonable potential serious complications from these interventional neuraxial procedures, and thankfully all are quite rare.    - Safety from interventional pain procedures is the product of appropriate skill and training, and this includes the entire medical and nursing team understanding of these procedures and the process, fellowship trained and board certified interventional pain specialists, and use of appropriate imaging modalities to confirm the appropriate application of the techniques  -Overall incidence of any serious complication that includes temporary and reversible complications has been estimated at 0.11%.  Overall incidence of serious complications requiring additional medical treatment is estimated at <1 in   "20,000 (0.02%)   -Incidence of an infection after a procedure is estimated at <1:50,000, and risk of a severe infection such as epidural abscess estimated at less than 1 in 500,000  -Risk of severe bleeding, epidural hematoma, is somewhere between 1 and 150,000-220,000  - Temporary numbness or short-term paralysis after injection is very rare.  At this time I have not been able to find a definitive number on reversible nerve problems after injection.  There have been 114 total claims of paralysis after epidural injection in the [place] over the past 30 years.  In context, there are about 9 million epidural injections performed in the [place] every year.  - Risk of death after procedure is so incredibly rare and cannot be estimated.  There are statistics about maternal mortality when epidurals are used in the obstetric setting, but these numbers are not relevant to interventional pain  -Risk of bone loss or osteoporosis from epidural steroid injections has not been shown to be an independent risk factor.  This was noted from research from the journal Pain Physician in 2012.  In our practice, we still want to be careful not to give too many steroids too often and avoid high annual total doses of steroids  -Inpatient see you have significant depression or marcos are other psychiatric comorbidities, there is a less than 1% chance of the spacings having mood problems after an epidural steroid injection.  We have to weigh that risk, which is somewhere between 0.01% and 0.1% with the risk of worsening of the psychiatric comorbidities because of depressive symptoms associated with chronic pain or severe pain.  There has been one case report of a person without any psychiatric history having what was called \"steroid marcos\" after having epidural steroid injection.  -With regards to blood glucose levels, we have long been concerned about hyperglycemia after giving steroids.  There has not been shown to be any " association with increasing blood sugar or increasing risk of diabetes in patients who do not already have diabetes.  There is evidence that patients with diabetes may have increased blood sugar levels for 1 to 2 days after an epidural steroid injection, and this may be a  point increase, but it resolves within 24 hours.  There is no association with increasing hemoglobin A1c with epidural steroid injections which is reassuring regarding short or even long-term detrimental effects even in patients with diabetes  -Steroids given in the epidural space do not seem to affect the adrenal glands in the same way that steroids do that are given intravenously or in muscular injections or an pill form to the GI tract.  Adrenal gland suppression from epidural steroids is shown to be quite rare, and temporary when it does occur.  -Risk of dural puncture, which is a leak of spinal fluid, is estimated somewhere between 0.1% and 1%.  Again this data includes epidural injections being placed in the obstetric setting as well as interventional pain setting, and it is the opinion of this practice that with the assistance of image guidance in a controlled setting that the risk of a dural puncture is lower than it would be as compared to using nonimage guided techniques placing an epidural in a woman in labor.
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
## 2025-05-02 ENCOUNTER — TELEPHONE (OUTPATIENT)
Dept: FAMILY MEDICINE CLINIC | Facility: CLINIC | Age: 60
End: 2025-05-02
Payer: COMMERCIAL

## 2025-05-02 NOTE — TELEPHONE ENCOUNTER
"    Caller: Victor ManuelKeke saldaña \"Keke Reynoso\"    Relationship: Self    Best call back number: [phone]    What orders are you requesting (i.e. lab or imaging): TSH     In what timeframe would the patient need to come in: PRIOR TO APPOINTMENT ON 05/23/25    Where will you receive your lab/imaging services: OFFICE    Additional notes: PATIENT STATED THAT SHE HAS BEEN FEELING MORE TIRED LATELY. PATIENT WOULD LIKE TO HAVE HER TSH CHECKED. PATIENT WOULD LIKE TO RECEIVE A CALL BACK TO LET HER KNOW IF DR. BRAR IS IN THE OFFICE TODAY AND IF SHE IS WILLING TO PUT THE ORDER IN. PATIENT WOULD ALSO LIKE A CALL BACK TO GET SCHEDULED. PLEASE ADVISE.      "

## 2025-05-05 DIAGNOSIS — R53.83 OTHER FATIGUE: ICD-10-CM

## 2025-05-05 DIAGNOSIS — E03.9 HYPOTHYROIDISM, UNSPECIFIED TYPE: Primary | ICD-10-CM

## 2025-05-05 DIAGNOSIS — E78.5 MILD HYPERLIPIDEMIA: ICD-10-CM

## 2025-05-05 DIAGNOSIS — D72.819 LEUKOPENIA, UNSPECIFIED TYPE: ICD-10-CM

## 2025-05-05 DIAGNOSIS — F41.8 SITUATIONAL ANXIETY: ICD-10-CM

## 2025-05-23 ENCOUNTER — OFFICE VISIT (OUTPATIENT)
Dept: FAMILY MEDICINE CLINIC | Facility: CLINIC | Age: 60
End: 2025-05-23
Payer: COMMERCIAL

## 2025-05-23 VITALS
SYSTOLIC BLOOD PRESSURE: 100 MMHG | HEIGHT: 63 IN | OXYGEN SATURATION: 97 % | TEMPERATURE: 99.3 F | WEIGHT: 146.6 LBS | BODY MASS INDEX: 25.98 KG/M2 | HEART RATE: 94 BPM | DIASTOLIC BLOOD PRESSURE: 62 MMHG

## 2025-05-23 DIAGNOSIS — F41.8 SITUATIONAL ANXIETY: ICD-10-CM

## 2025-05-23 DIAGNOSIS — R53.82 CHRONIC FATIGUE: ICD-10-CM

## 2025-05-23 DIAGNOSIS — M19.90 ARTHRITIS: ICD-10-CM

## 2025-05-23 DIAGNOSIS — Z86.69 HISTORY OF TINNITUS: ICD-10-CM

## 2025-05-23 DIAGNOSIS — E78.5 MILD HYPERLIPIDEMIA: ICD-10-CM

## 2025-05-23 DIAGNOSIS — R09.82 PND (POST-NASAL DRIP): ICD-10-CM

## 2025-05-23 DIAGNOSIS — E03.9 HYPOTHYROIDISM, UNSPECIFIED TYPE: Primary | ICD-10-CM

## 2025-05-23 DIAGNOSIS — R68.89 MULTIPLE COMPLAINTS: ICD-10-CM

## 2025-05-23 DIAGNOSIS — L30.9 ECZEMA, UNSPECIFIED TYPE: ICD-10-CM

## 2025-05-23 DIAGNOSIS — M51.369 L4-L5 DISC BULGE: ICD-10-CM

## 2025-05-23 DIAGNOSIS — M25.50 MULTIPLE JOINT PAIN: ICD-10-CM

## 2025-05-23 PROBLEM — G89.29 CHRONIC BILATERAL LOW BACK PAIN WITHOUT SCIATICA: Status: RESOLVED | Noted: 2024-12-14 | Resolved: 2025-05-23

## 2025-05-23 PROBLEM — H93.13 TINNITUS OF BOTH EARS: Status: RESOLVED | Noted: 2024-12-14 | Resolved: 2025-05-23

## 2025-05-23 PROBLEM — S39.012A LUMBAR STRAIN: Status: RESOLVED | Noted: 2025-02-21 | Resolved: 2025-05-23

## 2025-05-23 PROBLEM — M54.50 CHRONIC BILATERAL LOW BACK PAIN WITHOUT SCIATICA: Status: RESOLVED | Noted: 2024-12-14 | Resolved: 2025-05-23
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
## 2025-05-23 NOTE — PATIENT INSTRUCTIONS
Further recommendations based on labs, regarding autoimmune, inflammatory, and chronic fatigue workup    Follow-up with pain management, needs to consider with injections for chronic low back pain    Follow back up sooner if still with multiple joint pain, especially right knee or right shoulder

## 2025-05-27 LAB
25(OH)D3+25(OH)D2 SERPL-MCNC: 49.4 NG/ML (ref 30–100)
ANA SER QL: NEGATIVE
ERYTHROCYTE [SEDIMENTATION RATE] IN BLOOD BY WESTERGREN METHOD: 6 MM/HR (ref 0–30)
FERRITIN SERPL-MCNC: 83.1 NG/ML (ref 13–150)
FOLATE SERPL-MCNC: 8.18 NG/ML (ref 4.78–24.2)
IRON SATN MFR SERPL: 20 % (ref 20–50)
IRON SERPL-MCNC: 79 MCG/DL (ref 37–145)
RHEUMATOID FACT SERPL-ACNC: 10.5 IU/ML
TIBC SERPL-MCNC: 386 MCG/DL
UIBC SERPL-MCNC: 307 MCG/DL (ref 112–346)
VIT B12 SERPL-MCNC: 759 PG/ML (ref 211–946)

## 2025-06-06 ENCOUNTER — TELEPHONE (OUTPATIENT)
Dept: PAIN MEDICINE | Facility: CLINIC | Age: 60
End: 2025-06-06

## 2025-06-06 NOTE — TELEPHONE ENCOUNTER
Patient states that she has improved some since her visit with you and she wanted to know if she should continue with the procedure. Patient states that she has had 20% improvement since her visit. She also wanted to know if she had to have another visit prior to the procedure.

## 2025-06-06 NOTE — TELEPHONE ENCOUNTER
Caller: PATIENT     Relationship:     Best call back number: [phone] (home) [phone] (work)      What is the best time to reach you: NA     Who are you requesting to speak with (clinical staff, provider,  specific staff member): CLINICAL     Do you know the name of the person who called: NA     What was the call regarding: PATIENT HAS CLINICAL QUESTION AND WOULD LIKE TO SPEAK WITH STAFF IN REGARD TO PROCEDURE.  PATIENT WAS LAST SEEN 4/21/2025 BY DR PINEDA      Is it okay if the provider responds through MyChart:

## 2025-06-07 NOTE — TELEPHONE ENCOUNTER
Glad to hear that she has had some improvement.      If she feels like she is able to do the things she needs to and is not frequently distracted by pain, then I think that is the threshold to decide to hold off on the procedure.      20% is modest improvement, I would hope that an injection would help her quite a bit more than that.      If her pain pattern/qualtiy/distribution has not changed, then we could proceed with the previous procedure plan if she would like, and we could submit it for insurance authorization without coming in for a follow up office visit.     Cc'ing this note to the whole clinical team, please call her at first available opportunity

## 2025-07-18 ENCOUNTER — OFFICE VISIT (OUTPATIENT)
Dept: FAMILY MEDICINE CLINIC | Facility: CLINIC | Age: 60
End: 2025-07-18
Payer: COMMERCIAL

## 2025-07-18 VITALS
DIASTOLIC BLOOD PRESSURE: 72 MMHG | HEART RATE: 84 BPM | WEIGHT: 148.2 LBS | BODY MASS INDEX: 26.26 KG/M2 | SYSTOLIC BLOOD PRESSURE: 110 MMHG | OXYGEN SATURATION: 97 % | HEIGHT: 63 IN | TEMPERATURE: 97.5 F

## 2025-07-18 DIAGNOSIS — G89.29 CHRONIC PAIN OF RIGHT KNEE: ICD-10-CM

## 2025-07-18 DIAGNOSIS — M75.01 ADHESIVE CAPSULITIS OF RIGHT SHOULDER: ICD-10-CM

## 2025-07-18 DIAGNOSIS — M25.511 CHRONIC RIGHT SHOULDER PAIN: ICD-10-CM
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
## 2025-07-18 DIAGNOSIS — G89.29 CHRONIC RIGHT SHOULDER PAIN: ICD-10-CM

## 2025-07-18 DIAGNOSIS — M51.369 L4-L5 DISC BULGE: ICD-10-CM

## 2025-07-18 DIAGNOSIS — M75.81 TENDINITIS OF RIGHT ROTATOR CUFF: ICD-10-CM

## 2025-07-18 DIAGNOSIS — T07.XXXA MULTIPLE INJURIES: ICD-10-CM

## 2025-07-18 DIAGNOSIS — M25.561 CHRONIC PAIN OF RIGHT KNEE: ICD-10-CM

## 2025-07-18 RX ORDER — IBUPROFEN 600 MG/1
TABLET, FILM COATED ORAL
Qty: 25 TABLET | Refills: 3 | Status: SHIPPED | OUTPATIENT
Start: 2025-07-18

## 2025-07-18 RX ORDER — METHYLPREDNISOLONE 4 MG/1
TABLET ORAL
Qty: 1 EACH | Refills: 0 | Status: SHIPPED | OUTPATIENT
Start: 2025-07-18

## 2025-07-18 NOTE — PATIENT INSTRUCTIONS
Start Medrol Dosepak as directed, hold ibuprofen when taking the steroid pack.  Then may resume your ibuprofen referral to Ortho to help with both right shoulder pain and knee pain, likely injections
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
## 2025-08-07 ENCOUNTER — OFFICE VISIT (OUTPATIENT)
Dept: ORTHOPEDIC SURGERY | Facility: CLINIC | Age: 60
End: 2025-08-07
Payer: COMMERCIAL

## 2025-08-07 VITALS — TEMPERATURE: 98.4 F | HEIGHT: 63 IN | WEIGHT: 148 LBS | BODY MASS INDEX: 26.22 KG/M2

## 2025-08-07 DIAGNOSIS — M54.2 NECK PAIN: ICD-10-CM

## 2025-08-07 DIAGNOSIS — S46.011A TRAUMATIC TEAR OF RIGHT ROTATOR CUFF, UNSPECIFIED TEAR EXTENT, INITIAL ENCOUNTER: Primary | ICD-10-CM

## 2025-08-14 ENCOUNTER — HOSPITAL ENCOUNTER (OUTPATIENT)
Facility: HOSPITAL | Age: 60
Discharge: HOME | End: 2025-08-14
Admitting: ORTHOPAEDIC SURGERY
Payer: COMMERCIAL

## 2025-08-14 DIAGNOSIS — S46.011A TRAUMATIC TEAR OF RIGHT ROTATOR CUFF, UNSPECIFIED TEAR EXTENT, INITIAL ENCOUNTER: ICD-10-CM

## 2025-08-14 PROCEDURE — 73221 MRI JOINT UPR EXTREM W/O DYE: CPT

## 2025-08-21 ENCOUNTER — OFFICE VISIT (OUTPATIENT)
Dept: ORTHOPEDIC SURGERY | Facility: CLINIC | Age: 60
End: 2025-08-21
Payer: COMMERCIAL

## 2025-08-21 VITALS — WEIGHT: 147 LBS | TEMPERATURE: 97.8 F | BODY MASS INDEX: 26.05 KG/M2 | HEIGHT: 63 IN

## 2025-08-21 DIAGNOSIS — M75.41 IMPINGEMENT SYNDROME OF RIGHT SHOULDER: Primary | ICD-10-CM

## 2025-08-21 PROCEDURE — 99213 OFFICE O/P EST LOW 20 MIN: CPT | Performed by: ORTHOPAEDIC SURGERY

## 2025-08-29 ENCOUNTER — OFFICE VISIT (OUTPATIENT)
Dept: NEUROSURGERY | Facility: CLINIC | Age: 60
End: 2025-08-29
Payer: COMMERCIAL

## 2025-08-29 VITALS
HEIGHT: 63 IN | DIASTOLIC BLOOD PRESSURE: 70 MMHG | SYSTOLIC BLOOD PRESSURE: 110 MMHG | WEIGHT: 147 LBS | BODY MASS INDEX: 26.05 KG/M2 | OXYGEN SATURATION: 98 % | HEART RATE: 69 BPM

## 2025-08-29 DIAGNOSIS — G89.29 CHRONIC RIGHT SHOULDER PAIN: Primary | ICD-10-CM

## 2025-08-29 DIAGNOSIS — G89.29 NECK PAIN, CHRONIC: ICD-10-CM

## 2025-08-29 DIAGNOSIS — M25.511 CHRONIC RIGHT SHOULDER PAIN: Primary | ICD-10-CM

## 2025-08-29 DIAGNOSIS — M54.2 NECK PAIN, CHRONIC: ICD-10-CM

## 2025-08-29 PROCEDURE — 99213 OFFICE O/P EST LOW 20 MIN: CPT | Performed by: NEUROLOGICAL SURGERY
